# Patient Record
Sex: FEMALE | Race: ASIAN | NOT HISPANIC OR LATINO | Employment: STUDENT | URBAN - METROPOLITAN AREA
[De-identification: names, ages, dates, MRNs, and addresses within clinical notes are randomized per-mention and may not be internally consistent; named-entity substitution may affect disease eponyms.]

---
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
## 2021-05-26 ENCOUNTER — TELEPHONE (OUTPATIENT)
Dept: FAMILY MEDICINE CLINIC | Facility: CLINIC | Age: 21
End: 2021-05-26

## 2021-05-26 NOTE — TELEPHONE ENCOUNTER
PATIENT PREVIOUSLY SAW DR CORMIER AND IS LOOKING TO RE-ESTABLISH CARE AS SHE NEEDS A PHYSICAL AND TB SKIN TEST FOR SCHOOL. HUB OFFERED FIRST OPENING ON 8/27 BUT SHE NEEDS THIS BEFORE 8/23. PATIENT REQUESTED A MESSAGE BE SENT BACK.    PLEASE ADVISE: [phone]

## 2022-05-11 ENCOUNTER — TELEPHONE (OUTPATIENT)
Dept: PEDIATRICS | Facility: OTHER | Age: 22
End: 2022-05-11

## 2022-05-11 NOTE — TELEPHONE ENCOUNTER
Patient last seen in 2017 so technically she is a new patient I cannot put referral without seen her, she needs to make an appointment. Thanks

## 2022-05-11 NOTE — TELEPHONE ENCOUNTER
Caller: THEFACUNDO MUM     Relationship: Mother    Best call back number: [phone]    What is the medical concern/diagnosis: INCONSISTENT PERIODS     What specialty or service is being requested: GYNECOLOGIST     Any additional details: PATIENT'S MOTHER STATED THAT PATIENT CANNOT COME TO HER APPOINTMENT WITH DR CORMIER DUE TO FINALS. PATIENT WILL BE CALLING BACK TO CANCEL. PATIENT'S MOTHER WANTED TO KNOW IF DR CORMIER COULD RECOMMEND A GYNECOLOGIST OR ANY OTHER SPECIALIST THAT CAN HELP PATIENT WITH INCONSISTENT PERIODS. PLEASE ADVISE.

## 2024-06-14 ENCOUNTER — LAB (OUTPATIENT)
Dept: FAMILY MEDICINE CLINIC | Facility: CLINIC | Age: 24
End: 2024-06-14
Payer: COMMERCIAL

## 2024-06-14 ENCOUNTER — OFFICE VISIT (OUTPATIENT)
Dept: FAMILY MEDICINE CLINIC | Facility: CLINIC | Age: 24
End: 2024-06-14
Payer: COMMERCIAL

## 2024-06-14 VITALS
SYSTOLIC BLOOD PRESSURE: 112 MMHG | WEIGHT: 259.6 LBS | BODY MASS INDEX: 44.32 KG/M2 | HEIGHT: 64 IN | OXYGEN SATURATION: 97 % | DIASTOLIC BLOOD PRESSURE: 80 MMHG | RESPIRATION RATE: 16 BRPM | HEART RATE: 102 BPM | TEMPERATURE: 97.3 F

## 2024-06-14 DIAGNOSIS — M25.511 RIGHT SHOULDER PAIN, UNSPECIFIED CHRONICITY: ICD-10-CM

## 2024-06-14 DIAGNOSIS — Z00.00 ENCOUNTER FOR WELL ADULT EXAM WITHOUT ABNORMAL FINDINGS: ICD-10-CM

## 2024-06-14 DIAGNOSIS — E66.01 CLASS 3 SEVERE OBESITY DUE TO EXCESS CALORIES WITH BODY MASS INDEX (BMI) OF 45.0 TO 49.9 IN ADULT, UNSPECIFIED WHETHER SERIOUS COMORBIDITY PRESENT: ICD-10-CM

## 2024-06-14 DIAGNOSIS — Z00.00 ENCOUNTER FOR WELL ADULT EXAM WITHOUT ABNORMAL FINDINGS: Primary | ICD-10-CM

## 2024-06-14 PROBLEM — E66.813 CLASS 3 SEVERE OBESITY DUE TO EXCESS CALORIES WITH BODY MASS INDEX (BMI) OF 45.0 TO 49.9 IN ADULT: Status: ACTIVE | Noted: 2024-06-14

## 2024-06-14 LAB
ALBUMIN SERPL-MCNC: 4 G/DL (ref 3.5–5.2)
ALBUMIN/GLOB SERPL: 1.1 G/DL
ALP SERPL-CCNC: 76 U/L (ref 39–117)
ALT SERPL W P-5'-P-CCNC: 20 U/L (ref 1–33)
ANION GAP SERPL CALCULATED.3IONS-SCNC: 10.3 MMOL/L (ref 5–15)
AST SERPL-CCNC: 12 U/L (ref 1–32)
BILIRUB SERPL-MCNC: 0.4 MG/DL (ref 0–1.2)
BUN SERPL-MCNC: 9 MG/DL (ref 6–20)
BUN/CREAT SERPL: 11.3 (ref 7–25)
CALCIUM SPEC-SCNC: 9.2 MG/DL (ref 8.6–10.5)
CHLORIDE SERPL-SCNC: 104 MMOL/L (ref 98–107)
CHOLEST SERPL-MCNC: 179 MG/DL (ref 0–200)
CO2 SERPL-SCNC: 25.7 MMOL/L (ref 22–29)
CREAT SERPL-MCNC: 0.8 MG/DL (ref 0.57–1)
DEPRECATED RDW RBC AUTO: 37.1 FL (ref 37–54)
EGFRCR SERPLBLD CKD-EPI 2021: 105 ML/MIN/1.73
ERYTHROCYTE [DISTWIDTH] IN BLOOD BY AUTOMATED COUNT: 14.3 % (ref 12.3–15.4)
GLOBULIN UR ELPH-MCNC: 3.8 GM/DL
GLUCOSE SERPL-MCNC: 100 MG/DL (ref 65–99)
HCT VFR BLD AUTO: 47.6 % (ref 34–46.6)
HDLC SERPL-MCNC: 43 MG/DL (ref 40–60)
HGB BLD-MCNC: 14.4 G/DL (ref 12–15.9)
LDLC SERPL CALC-MCNC: 117 MG/DL (ref 0–100)
LDLC/HDLC SERPL: 2.69 {RATIO}
MCH RBC QN AUTO: 22.6 PG (ref 26.6–33)
MCHC RBC AUTO-ENTMCNC: 30.3 G/DL (ref 31.5–35.7)
MCV RBC AUTO: 74.8 FL (ref 79–97)
PLATELET # BLD AUTO: 505 10*3/MM3 (ref 140–450)
PMV BLD AUTO: 9.5 FL (ref 6–12)
POTASSIUM SERPL-SCNC: 4.3 MMOL/L (ref 3.5–5.2)
PROT SERPL-MCNC: 7.8 G/DL (ref 6–8.5)
RBC # BLD AUTO: 6.36 10*6/MM3 (ref 3.77–5.28)
SODIUM SERPL-SCNC: 140 MMOL/L (ref 136–145)
TRIGL SERPL-MCNC: 102 MG/DL (ref 0–150)
TSH SERPL DL<=0.05 MIU/L-ACNC: 1.99 UIU/ML (ref 0.27–4.2)
VLDLC SERPL-MCNC: 19 MG/DL (ref 5–40)
WBC NRBC COR # BLD AUTO: 10.61 10*3/MM3 (ref 3.4–10.8)

## 2024-06-14 PROCEDURE — 80050 GENERAL HEALTH PANEL: CPT | Performed by: FAMILY MEDICINE

## 2024-06-14 PROCEDURE — 80061 LIPID PANEL: CPT | Performed by: FAMILY MEDICINE

## 2024-06-14 PROCEDURE — 99385 PREV VISIT NEW AGE 18-39: CPT | Performed by: FAMILY MEDICINE

## 2024-06-14 PROCEDURE — 36415 COLL VENOUS BLD VENIPUNCTURE: CPT

## 2024-06-14 NOTE — ASSESSMENT & PLAN NOTE
Patient's (Body mass index is 45.26 kg/m².)  Weight is worsening. BMI  is above average; BMI management plan is completed. We discussed portion control and increasing exercise.

## 2024-06-14 NOTE — PROGRESS NOTES
Opal Prather is a 24 y.o. female.     History of Present Illness   The patient comes in today for establish care/ annual physical.  The patient is complaining of right shoulder pain and right wrist pain . The symptoms noted 2 weeks ago while she was helping her dad to pull something out from pound.  The pain described mild and intermittent.  She denies neck pain, arm weakness, headache, cough,chest pain, palpitations, dizziness, abdominal pain, nausea, vomiting, dysuria and SOB. The patient admits to dietary compliance is  fair  and exercising occasionally.  She is a non-smoker.       The following portions of the patient's history were reviewed and updated as appropriate: past medical history, past social history, past surgical history and problem list.    Review of Systems   Constitutional:  Negative for activity change and appetite change.   HENT:  Negative for ear pain, sinus pressure and sore throat.    Eyes:  Negative for visual disturbance.   Respiratory:  Negative for shortness of breath and wheezing.    Cardiovascular:  Negative for chest pain and palpitations.   Gastrointestinal:  Negative for abdominal pain and indigestion.   Musculoskeletal:  Negative for neck pain.        Right shoulder pain and right wrist pain   Neurological:  Negative for headache.   Psychiatric/Behavioral:  Negative for sleep disturbance and depressed mood. The patient is not nervous/anxious.        Objective   Physical Exam  Vitals reviewed.   Constitutional:       Appearance: She is well-developed.   HENT:      Right Ear: Tympanic membrane, ear canal and external ear normal.      Left Ear: Tympanic membrane, ear canal and external ear normal.   Neck:      Thyroid: No thyromegaly.   Cardiovascular:      Heart sounds: Normal heart sounds.   Pulmonary:      Effort: Pulmonary effort is normal.      Breath sounds: Normal breath sounds. No wheezing.   Abdominal:      Tenderness: There is no abdominal tenderness.    Musculoskeletal:         General: No tenderness. Normal range of motion.      Right shoulder: No tenderness. Normal range of motion.      Right wrist: No tenderness. Normal range of motion.      Cervical back: Normal range of motion and neck supple. No tenderness.   Neurological:      Mental Status: She is alert and oriented to person, place, and time.   Psychiatric:         Mood and Affect: Mood normal.         Vitals:    06/14/24 1050   BP: 112/80   Pulse: 102   Resp: 16   Temp: 97.3 °F (36.3 °C)   SpO2: 97%   Body mass index is 45.26 kg/m².  Class 3 Severe Obesity (BMI >=40). Obesity-related health conditions include the following: . Obesity is worsening. BMI is is above average; BMI management plan is completed. We discussed portion control and increasing exercise.   No current outpatient medications on file prior to visit.     No current facility-administered medications on file prior to visit.         Assessment & Plan   Problems Addressed this Visit       Encounter for well adult exam without abnormal findings - Primary     Discussed healthy diet and  importance of regular exercise. Stressed importance of moderation in sodium/caffeine intake,  cholesterol, caloric balance, sufficient intake of fresh fruits and vegetables.            Relevant Orders    TSH    CBC No Differential    Lipid panel    Comprehensive metabolic panel    Class 3 severe obesity due to excess calories with body mass index (BMI) of 45.0 to 49.9 in adult     Patient's (Body mass index is 45.26 kg/m².)  Weight is worsening. BMI  is above average; BMI management plan is completed. We discussed portion control and increasing exercise.          Relevant Orders    TSH    Lipid panel    Right shoulder pain     Discussed intermittent application of heat,  analgesics and ibuprofen as needed.          Diagnoses         Codes Comments    Encounter for well adult exam without abnormal findings    -  Primary ICD-10-CM: Z00.00  ICD-9-CM: V70.0      Class 3 severe obesity due to excess calories with body mass index (BMI) of 45.0 to 49.9 in adult, unspecified whether serious comorbidity present     ICD-10-CM: E66.01, Z68.42  ICD-9-CM: 278.01, V85.42     Right shoulder pain, unspecified chronicity     ICD-10-CM: M25.511  ICD-9-CM: 719.41

## 2024-06-17 DIAGNOSIS — R79.89 INCREASED PLATELET COUNT: Primary | ICD-10-CM

## 2024-07-01 ENCOUNTER — LAB (OUTPATIENT)
Dept: FAMILY MEDICINE CLINIC | Facility: CLINIC | Age: 24
End: 2024-07-01
Payer: COMMERCIAL

## 2024-07-01 DIAGNOSIS — R79.89 INCREASED PLATELET COUNT: ICD-10-CM

## 2024-07-01 LAB
BASOPHILS # BLD AUTO: 0.06 10*3/MM3 (ref 0–0.2)
BASOPHILS NFR BLD AUTO: 0.5 % (ref 0–1.5)
DEPRECATED RDW RBC AUTO: 37.6 FL (ref 37–54)
EOSINOPHIL # BLD AUTO: 0.51 10*3/MM3 (ref 0–0.4)
EOSINOPHIL NFR BLD AUTO: 4.4 % (ref 0.3–6.2)
ERYTHROCYTE [DISTWIDTH] IN BLOOD BY AUTOMATED COUNT: 14.2 % (ref 12.3–15.4)
HCT VFR BLD AUTO: 44.8 % (ref 34–46.6)
HGB BLD-MCNC: 13.3 G/DL (ref 12–15.9)
IMM GRANULOCYTES # BLD AUTO: 0.06 10*3/MM3 (ref 0–0.05)
IMM GRANULOCYTES NFR BLD AUTO: 0.5 % (ref 0–0.5)
LYMPHOCYTES # BLD AUTO: 4.39 10*3/MM3 (ref 0.7–3.1)
LYMPHOCYTES NFR BLD AUTO: 37.8 % (ref 19.6–45.3)
MCH RBC QN AUTO: 22.2 PG (ref 26.6–33)
MCHC RBC AUTO-ENTMCNC: 29.7 G/DL (ref 31.5–35.7)
MCV RBC AUTO: 74.8 FL (ref 79–97)
MONOCYTES # BLD AUTO: 0.67 10*3/MM3 (ref 0.1–0.9)
MONOCYTES NFR BLD AUTO: 5.8 % (ref 5–12)
NEUTROPHILS NFR BLD AUTO: 5.93 10*3/MM3 (ref 1.7–7)
NEUTROPHILS NFR BLD AUTO: 51 % (ref 42.7–76)
NRBC BLD AUTO-RTO: 0 /100 WBC (ref 0–0.2)
PLATELET # BLD AUTO: 499 10*3/MM3 (ref 140–450)
PMV BLD AUTO: 10.3 FL (ref 6–12)
RBC # BLD AUTO: 5.99 10*6/MM3 (ref 3.77–5.28)
WBC NRBC COR # BLD AUTO: 11.62 10*3/MM3 (ref 3.4–10.8)

## 2024-07-01 PROCEDURE — 36415 COLL VENOUS BLD VENIPUNCTURE: CPT

## 2024-07-01 PROCEDURE — 85025 COMPLETE CBC W/AUTO DIFF WBC: CPT | Performed by: FAMILY MEDICINE

## 2024-07-03 DIAGNOSIS — R79.89 ELEVATED PLATELET COUNT: Primary | ICD-10-CM

## 2024-07-03 DIAGNOSIS — R79.89 ABNORMAL CBC: ICD-10-CM

## 2024-07-16 ENCOUNTER — LAB (OUTPATIENT)
Dept: LAB | Facility: HOSPITAL | Age: 24
End: 2024-07-16
Payer: COMMERCIAL

## 2024-07-16 ENCOUNTER — CONSULT (OUTPATIENT)
Dept: ONCOLOGY | Facility: CLINIC | Age: 24
End: 2024-07-16
Payer: COMMERCIAL

## 2024-07-16 VITALS
BODY MASS INDEX: 45.04 KG/M2 | SYSTOLIC BLOOD PRESSURE: 112 MMHG | WEIGHT: 263.8 LBS | DIASTOLIC BLOOD PRESSURE: 79 MMHG | HEART RATE: 88 BPM | HEIGHT: 64 IN | OXYGEN SATURATION: 95 %

## 2024-07-16 DIAGNOSIS — D75.839 THROMBOCYTOSIS: ICD-10-CM

## 2024-07-16 DIAGNOSIS — D50.0 IRON DEFICIENCY ANEMIA DUE TO CHRONIC BLOOD LOSS: ICD-10-CM

## 2024-07-16 DIAGNOSIS — E53.8 FOLATE DEFICIENCY: ICD-10-CM

## 2024-07-16 DIAGNOSIS — D75.839 THROMBOCYTOSIS: Primary | ICD-10-CM

## 2024-07-16 LAB
ALBUMIN SERPL-MCNC: 4.2 G/DL (ref 3.5–5.2)
ALBUMIN/GLOB SERPL: 1.2 G/DL
ALP SERPL-CCNC: 76 U/L (ref 39–117)
ALT SERPL W P-5'-P-CCNC: 19 U/L (ref 1–33)
ANION GAP SERPL CALCULATED.3IONS-SCNC: 9 MMOL/L (ref 5–15)
AST SERPL-CCNC: 17 U/L (ref 1–32)
BASOPHILS # BLD AUTO: 0.03 10*3/MM3 (ref 0–0.2)
BASOPHILS NFR BLD AUTO: 0.3 % (ref 0–1.5)
BILIRUB SERPL-MCNC: 0.3 MG/DL (ref 0–1.2)
BUN SERPL-MCNC: 9 MG/DL (ref 6–20)
BUN/CREAT SERPL: 12.7 (ref 7–25)
CALCIUM SPEC-SCNC: 9.1 MG/DL (ref 8.6–10.5)
CHLORIDE SERPL-SCNC: 104 MMOL/L (ref 98–107)
CO2 SERPL-SCNC: 27 MMOL/L (ref 22–29)
CREAT SERPL-MCNC: 0.71 MG/DL (ref 0.57–1)
DEPRECATED RDW RBC AUTO: 42.4 FL (ref 37–54)
EGFRCR SERPLBLD CKD-EPI 2021: 121.2 ML/MIN/1.73
EOSINOPHIL # BLD AUTO: 0.48 10*3/MM3 (ref 0–0.4)
EOSINOPHIL NFR BLD AUTO: 4.4 % (ref 0.3–6.2)
ERYTHROCYTE [DISTWIDTH] IN BLOOD BY AUTOMATED COUNT: 16.6 % (ref 12.3–15.4)
FERRITIN SERPL-MCNC: 81.7 NG/ML (ref 13–150)
FOLATE SERPL-MCNC: 4.16 NG/ML (ref 4.78–24.2)
GLOBULIN UR ELPH-MCNC: 3.5 GM/DL
GLUCOSE SERPL-MCNC: 114 MG/DL (ref 65–99)
HCT VFR BLD AUTO: 45.3 % (ref 34–46.6)
HGB BLD-MCNC: 14 G/DL (ref 12–15.9)
IRON 24H UR-MRATE: 50 MCG/DL (ref 37–145)
IRON SATN MFR SERPL: 12 % (ref 20–50)
LYMPHOCYTES # BLD AUTO: 3.5 10*3/MM3 (ref 0.7–3.1)
LYMPHOCYTES NFR BLD AUTO: 32.2 % (ref 19.6–45.3)
MCH RBC QN AUTO: 23.1 PG (ref 26.6–33)
MCHC RBC AUTO-ENTMCNC: 30.9 G/DL (ref 31.5–35.7)
MCV RBC AUTO: 74.8 FL (ref 79–97)
MONOCYTES # BLD AUTO: 0.58 10*3/MM3 (ref 0.1–0.9)
MONOCYTES NFR BLD AUTO: 5.3 % (ref 5–12)
NEUTROPHILS NFR BLD AUTO: 57.8 % (ref 42.7–76)
NEUTROPHILS NFR BLD AUTO: 6.29 10*3/MM3 (ref 1.7–7)
PLATELET # BLD AUTO: 402 10*3/MM3 (ref 140–450)
PMV BLD AUTO: 9.6 FL (ref 6–12)
POTASSIUM SERPL-SCNC: 4.4 MMOL/L (ref 3.5–5.2)
PROT SERPL-MCNC: 7.7 G/DL (ref 6–8.5)
RBC # BLD AUTO: 6.06 10*6/MM3 (ref 3.77–5.28)
SODIUM SERPL-SCNC: 140 MMOL/L (ref 136–145)
TIBC SERPL-MCNC: 401 MCG/DL (ref 298–536)
TRANSFERRIN SERPL-MCNC: 269 MG/DL (ref 200–360)
VIT B12 BLD-MCNC: 397 PG/ML (ref 211–946)
WBC NRBC COR # BLD AUTO: 10.88 10*3/MM3 (ref 3.4–10.8)

## 2024-07-16 PROCEDURE — 84466 ASSAY OF TRANSFERRIN: CPT | Performed by: STUDENT IN AN ORGANIZED HEALTH CARE EDUCATION/TRAINING PROGRAM

## 2024-07-16 PROCEDURE — 82746 ASSAY OF FOLIC ACID SERUM: CPT | Performed by: STUDENT IN AN ORGANIZED HEALTH CARE EDUCATION/TRAINING PROGRAM

## 2024-07-16 PROCEDURE — 82607 VITAMIN B-12: CPT | Performed by: STUDENT IN AN ORGANIZED HEALTH CARE EDUCATION/TRAINING PROGRAM

## 2024-07-16 PROCEDURE — 83540 ASSAY OF IRON: CPT | Performed by: STUDENT IN AN ORGANIZED HEALTH CARE EDUCATION/TRAINING PROGRAM

## 2024-07-16 PROCEDURE — 80053 COMPREHEN METABOLIC PANEL: CPT | Performed by: STUDENT IN AN ORGANIZED HEALTH CARE EDUCATION/TRAINING PROGRAM

## 2024-07-16 PROCEDURE — 82728 ASSAY OF FERRITIN: CPT | Performed by: STUDENT IN AN ORGANIZED HEALTH CARE EDUCATION/TRAINING PROGRAM

## 2024-07-16 PROCEDURE — 36415 COLL VENOUS BLD VENIPUNCTURE: CPT

## 2024-07-16 PROCEDURE — 85025 COMPLETE CBC W/AUTO DIFF WBC: CPT

## 2024-07-16 RX ORDER — MEDROXYPROGESTERONE ACETATE 10 MG/1
TABLET ORAL
COMMUNITY
Start: 2023-12-04

## 2024-07-16 NOTE — PROGRESS NOTES
HEMATOLOGY ONCOLOGY OUTPATIENT FOLLOW UP       Patient name: Barrington Prather  : 2000  MRN: [id number]  Primary Care Physician: Wilner Zhu MD  Referring Physician: Wilner Zhu MD  Reason For Consult:         History of Present Illness:  Patient is a 24-year-old female who has been referred to us for further evaluation and management of elevated platelet counts.  Most recent labs were reviewed and are as follows:    2024:  CBC: 10.6/14.4/47.6/505 [MCV 74.8, MCH 22.6]    On review of labs, patient is noted to have microcytic anemia with relatively normal Hb/HCT over past many years.  Noted to have mildly elevated platelet counts.  This development is of recent onset.    Patient reported that she has remote history of iron deficiency anemia, however she has not been on  any oral or IV iron supplements.    She has history of menorrhagia and irregular menses.  She is currently on OCPs for irregular cycles for the last 1-2 years.  No past medical history of abnormal bleeding.    Reported only occasional alcohol use, no smoking history.    Family history:   Sister: Thyroid Cancer (at age 21), unclear type.      Subjective:  Patient seen today for initial evaluation.  She has some mild arthralgias and some chronic fatigue.  Denied any other acute issues presently.    History reviewed. No pertinent past medical history.    Past Surgical History:   Procedure Laterality Date    TONSILLECTOMY           Current Outpatient Medications:     medroxyPROGESTERone (PROVERA) 10 MG tablet, , Disp: , Rfl:     Allergies   Allergen Reactions    Amoxicillin Rash       Family History   Problem Relation Age of Onset    Hyperlipidemia Mother     Diabetes Father     Other Sister        Cancer-related family history is not on file.    Social History     Tobacco Use    Smoking status: Never     Passive exposure: Never    Smokeless tobacco: Never   Vaping Use    Vaping status: Never Used   Substance Use Topics    Alcohol  "use: Never    Drug use: Never     Social History     Social History Narrative    Not on file        Visit date not found.    ROS:   Review of Systems   Constitutional:  Positive for fatigue.   HENT: Negative.     Eyes: Negative.    Respiratory: Negative.     Cardiovascular: Negative.    Gastrointestinal: Negative.    Endocrine: Negative.    Genitourinary: Negative.    Musculoskeletal:  Positive for arthralgias.   Skin: Negative.    Allergic/Immunologic: Negative.    Neurological: Negative.    Hematological: Negative.    Psychiatric/Behavioral: Negative.         Objective:  Vital signs:  Vitals:    07/16/24 0844   BP: 112/79   Pulse: 88   SpO2: 95%   Weight: 120 kg (263 lb 12.8 oz)   Height: 161.3 cm (63.5\")   PainSc: 0-No pain     Body mass index is 46 kg/m².  ECOG  (0) Fully active, able to carry on all predisease performance without restriction    Physical Exam:   Physical Exam  Constitutional:       Appearance: Normal appearance. She is normal weight.   HENT:      Head: Normocephalic and atraumatic.      Right Ear: External ear normal.      Left Ear: External ear normal.      Nose: Nose normal.      Mouth/Throat:      Mouth: Mucous membranes are moist.      Pharynx: Oropharynx is clear.   Eyes:      Extraocular Movements: Extraocular movements intact.      Conjunctiva/sclera: Conjunctivae normal.      Pupils: Pupils are equal, round, and reactive to light.   Cardiovascular:      Rate and Rhythm: Normal rate.      Pulses: Normal pulses.   Pulmonary:      Effort: Pulmonary effort is normal.   Abdominal:      General: Abdomen is flat.      Palpations: Abdomen is soft.   Musculoskeletal:         General: Normal range of motion.      Cervical back: Normal range of motion and neck supple.   Skin:     General: Skin is warm.   Neurological:      Mental Status: She is alert.   Psychiatric:         Mood and Affect: Mood normal.         Behavior: Behavior normal.         Thought Content: Thought content normal.         " "Judgment: Judgment normal.         Lab Results - Last 18 Months   Lab Units 07/16/24  0928 07/01/24  0813 06/14/24  1132   WBC 10*3/mm3 10.88* 11.62* 10.61   HEMOGLOBIN g/dL 14.0 13.3 14.4   HEMATOCRIT % 45.3 44.8 47.6*   PLATELETS 10*3/mm3 402 499* 505*   MCV fL 74.8* 74.8* 74.8*     Lab Results - Last 18 Months   Lab Units 07/16/24  0928 06/14/24  1132   SODIUM mmol/L 140 140   POTASSIUM mmol/L 4.4 4.3   CHLORIDE mmol/L 104 104   CO2 mmol/L 27.0 25.7   BUN mg/dL 9 9   CREATININE mg/dL 0.71 0.80   CALCIUM mg/dL 9.1 9.2   BILIRUBIN mg/dL 0.3 0.4   ALK PHOS U/L 76 76   ALT (SGPT) U/L 19 20   AST (SGOT) U/L 17 12   GLUCOSE mg/dL 114* 100*       Lab Results   Component Value Date    GLUCOSE 100 (H) 06/14/2024    BUN 9 06/14/2024    CREATININE 0.80 06/14/2024    EGFRIFNONA NOT CALCULATED 03/08/2016    EGFRIFAFRI NOT CALCULATED 03/08/2016    BCR 11.3 06/14/2024    K 4.3 06/14/2024    CO2 25.7 06/14/2024    CALCIUM 9.2 06/14/2024    ALBUMIN 4.0 06/14/2024    LABIL2 1.1 08/08/2017    AST 12 06/14/2024    ALT 20 06/14/2024       No results for input(s): \"APTT\", \"INR\", \"PTT\" in the last 04798 hours.    Lab Results   Component Value Date    IRON 50 07/16/2024    TIBC 401 07/16/2024    FERRITIN 81.70 07/16/2024       Lab Results   Component Value Date    FOLATE 4.16 (L) 07/16/2024       No results found for: \"OCCULTBLD\"    No results found for: \"RETICCTPCT\"  Lab Results   Component Value Date    [id number] 397 07/16/2024     No results found for: \"SPEP\", \"UPEP\"  No results found for: \"LDH\", \"URICACID\"  No results found for: \"JOANNE\", \"RF\", \"SEDRATE\"  No results found for: \"FIBRINOGEN\", \"HAPTOGLOBIN\"  No results found for: \"PTT\", \"INR\"  No results found for: \"\"  No results found for: \"CEA\"  No components found for: \"CA-19-9\"  No results found for: \"PSA\"  No results found for: \"SEDRATE\"    Assessment & Plan       Iron deficiency:  Thrombocytosis:  -Outside labs reviewed, noted to have chronic microcytosis suggestive of ongoing " iron deficiency anemia.  Repeat CBC today showed normal Hb/HCT.  However low TSAT 12% is indicative of some iron deficiency anemia.  -Thrombocytosis appears likely reactive secondary to iron deficiency anemia, however given young age will perform workup to rule out MPN's and CML.  This is pending.  -Will start her on oral iron supplementation.  If no improvement in microcytosis, can be considered for Hb electrophoresis to rule out hemoglobinopathies.    Folate deficiency:   Patient is noted to have low folate levels.  Will start her on oral supplementation.    3-week follow-up to discuss lab results.  Sooner as needed.      Thank you very much for providing the opportunity to participate in this patient’s care. Please do not hesitate to call if there are any other questions.

## 2024-07-17 ENCOUNTER — PATIENT ROUNDING (BHMG ONLY) (OUTPATIENT)
Dept: ONCOLOGY | Facility: CLINIC | Age: 24
End: 2024-07-17
Payer: COMMERCIAL

## 2024-07-17 NOTE — PROGRESS NOTES
July 17, 2024    Hello, may I speak with Barrington Prather?    My name is Anusha Ortega      I am  with [facility]  [address] [place] [address] [phone].    Before we get started may I verify your date of birth? 2000    I am calling to officially welcome you to our practice and ask about your recent visit. Is this a good time to talk? no    Tell me about your visit with us. What things went well?  A My Chart message was sent to the patient.         We're always looking for ways to make our patients' experiences even better. Do you have recommendations on ways we may improve?  no    Overall were you satisfied with your first visit to our practice? yes       I appreciate you taking the time to speak with me today. Is there anything else I can do for you? no      Thank you, and have a great day.

## 2024-07-18 RX ORDER — FOLIC ACID 1 MG/1
1 TABLET ORAL DAILY
Qty: 90 TABLET | Refills: 1 | Status: SHIPPED | OUTPATIENT
Start: 2024-07-18

## 2024-07-18 RX ORDER — FERROUS SULFATE 325(65) MG
325 TABLET ORAL
Qty: 90 TABLET | Refills: 1 | Status: SHIPPED | OUTPATIENT
Start: 2024-07-18

## 2024-07-21 LAB
INTERPRETATION: NEGATIVE
LAB DIRECTOR NAME PROVIDER: NORMAL
LABORATORY COMMENT REPORT: NORMAL
REF LAB TEST METHOD: NORMAL
T(ABL1,BCR)B2A2/CONTROL BLD/T: NORMAL %
T(ABL1,BCR)B3A2/CONTROL BLD/T: NORMAL %
T(ABL1,BCR)E1A2/CONTROL BLD/T: NORMAL %

## 2024-07-22 LAB
CALR EXON 9 MUT ANL BLD/T: NORMAL
CITATION REF LAB TEST: NORMAL
LAB DIRECTOR NAME PROVIDER: NORMAL
Lab: NORMAL
REF LAB TEST METHOD: NORMAL

## 2024-07-25 LAB
JAK2 P.V617F BLD/T QL: NORMAL
LAB DIRECTOR NAME PROVIDER: NORMAL
LABORATORY COMMENT REPORT: NORMAL

## 2024-07-26 LAB
CITATION REF LAB TEST: NORMAL
LAB DIRECTOR NAME PROVIDER: NORMAL
MPL GENE MUT TESTED BLD/T: NORMAL
MPL P.W515L+W515K+S505N BLD/T QL: NORMAL
SERVICE CMNT-IMP: NORMAL

## 2024-07-30 ENCOUNTER — TELEPHONE (OUTPATIENT)
Dept: ONCOLOGY | Facility: CLINIC | Age: 24
End: 2024-07-30
Payer: COMMERCIAL

## 2024-07-30 NOTE — TELEPHONE ENCOUNTER
Pt moved to NP schedule per Dr Naranjo. TESSA ORDAZ on pt phone with that provider update and slight change to appt time.

## 2024-08-02 ENCOUNTER — OFFICE VISIT (OUTPATIENT)
Dept: FAMILY MEDICINE CLINIC | Facility: CLINIC | Age: 24
End: 2024-08-02
Payer: COMMERCIAL

## 2024-08-02 VITALS
WEIGHT: 258.7 LBS | TEMPERATURE: 97.2 F | OXYGEN SATURATION: 97 % | SYSTOLIC BLOOD PRESSURE: 111 MMHG | RESPIRATION RATE: 16 BRPM | DIASTOLIC BLOOD PRESSURE: 81 MMHG | HEART RATE: 87 BPM | BODY MASS INDEX: 44.16 KG/M2 | HEIGHT: 64 IN

## 2024-08-02 DIAGNOSIS — Z02.0 ENCOUNTER FOR SCHOOL EXAMINATION: Primary | ICD-10-CM

## 2024-08-02 PROCEDURE — 99212 OFFICE O/P EST SF 10 MIN: CPT | Performed by: FAMILY MEDICINE

## 2024-08-02 NOTE — PROGRESS NOTES
Mild Subjective   Barrington Prather is a 24 y.o. female.     History of Present Illness  24-year-old female patient present for school physical and fill out the school physical form. She is going to  nursing school, she is doing very well denies any complaint and concern.  Patient is stated she is up-to-date with immunization and has had already submitted her immunization record.  She brought physical activity form to be filled out.       The following portions of the patient's history were reviewed and updated as appropriate: past medical history, past social history, past surgical history and problem list.    Review of Systems   Respiratory:  Negative for shortness of breath and wheezing.    Musculoskeletal:  Negative for back pain.   Psychiatric/Behavioral:  Negative for sleep disturbance. The patient is not nervous/anxious.        Objective   Physical Exam  Vitals reviewed.   Pulmonary:      Effort: Pulmonary effort is normal.   Musculoskeletal:         General: Normal range of motion.   Neurological:      Mental Status: She is alert and oriented to person, place, and time.   Psychiatric:         Mood and Affect: Mood normal.         Vitals:    08/02/24 1337   BP: 111/81   Pulse: 87   Resp: 16   Temp: 97.2 °F (36.2 °C)   SpO2: 97%     Current Outpatient Medications on File Prior to Visit   Medication Sig Dispense Refill    ferrous sulfate 325 (65 FE) MG tablet Take 1 tablet by mouth Daily With Breakfast. 90 tablet 1    folic acid (FOLVITE) 1 MG tablet Take 1 tablet by mouth Daily. 90 tablet 1    medroxyPROGESTERone (PROVERA) 10 MG tablet        No current facility-administered medications on file prior to visit.         Assessment & Plan   Problems Addressed this Visit       Encounter for school examination - Primary     Patient is doing well exam normal.  Discussed dietary changes and lifestyle modifications,  Physical activity forms filled and signed.          Diagnoses         Codes Comments    Encounter for  school examination and fill out form    -  Primary ICD-10-CM: Z02.0  ICD-9-CM: V70.5

## 2024-08-03 PROBLEM — Z02.0 ENCOUNTER FOR SCHOOL EXAMINATION: Status: ACTIVE | Noted: 2024-06-14

## 2024-08-03 NOTE — ASSESSMENT & PLAN NOTE
Patient is doing well exam normal.  Discussed dietary changes and lifestyle modifications,  Physical activity forms filled and signed.

## 2024-08-07 NOTE — PROGRESS NOTES
HEMATOLOGY ONCOLOGY OUTPATIENT FOLLOW UP       Patient name: Barrington Prather  : 2000  MRN: [id number]  Primary Care Physician: Wilner Zhu MD  Referring Physician: No ref. provider found  Reason For Consult:         History of Present Illness:  Barrington Prather is a 24 y.o.  female who has been referred to us for further evaluation and management of elevated platelet counts.  Most recent labs were reviewed and are as follows:    2024:  CBC: 10.6/14.4/47.6/505 [MCV 74.8, MCH 22.6]    On review of labs, patient is noted to have microcytic anemia with relatively normal Hb/HCT over past many years.  Noted to have mildly elevated platelet counts.  This development is of recent onset.    Patient reported that she has remote history of iron deficiency anemia, however she has not been on  any oral or IV iron supplements.    She has history of menorrhagia and irregular menses.  She is currently on OCPs for irregular cycles for the last 1-2 years.  No past medical history of abnormal bleeding.    Reported only occasional alcohol use, no smoking history.    Family history:   Sister: Thyroid Cancer (at age 21), unclear type.    Patient seen today for initial evaluation.  She has some mild arthralgias and some chronic fatigue.  Denied any other acute issues presently.    2024: WBC 10.88, hemoglobin 14.0, MCV 74.8, platelets 402,000.    JAK2 analysis negative for JAK2 V617F mutation, CALR mutation negative, BCR-ABL negative. No MPL mutation identified.     Subjective:  2024: Patient presents today for routine follow-up and lab review.  She reports overall feeling well and has no new complaints today.  She has not been taking folic acid or iron supplementation.  She states she did get a call from the pharmacy and will  her medications today and begin.      History reviewed. No pertinent past medical history.    Past Surgical History:   Procedure Laterality Date    TONSILLECTOMY           Current  "Outpatient Medications:     medroxyPROGESTERone (PROVERA) 10 MG tablet, , Disp: , Rfl:     ferrous sulfate 325 (65 FE) MG tablet, Take 1 tablet by mouth Daily With Breakfast. (Patient not taking: Reported on 8/8/2024), Disp: 90 tablet, Rfl: 1    folic acid (FOLVITE) 1 MG tablet, Take 1 tablet by mouth Daily. (Patient not taking: Reported on 8/8/2024), Disp: 90 tablet, Rfl: 1    Allergies   Allergen Reactions    Amoxicillin Rash       Family History   Problem Relation Age of Onset    Hyperlipidemia Mother     Diabetes Father     Other Sister        Cancer-related family history is not on file.    Social History     Tobacco Use    Smoking status: Never     Passive exposure: Never    Smokeless tobacco: Never   Vaping Use    Vaping status: Never Used   Substance Use Topics    Alcohol use: Never    Drug use: Never     Social History     Social History Narrative    Not on file        Visit date not found.    ROS:   Review of Systems   Constitutional: Negative.    HENT: Negative.     Respiratory: Negative.     Cardiovascular: Negative.    Gastrointestinal: Negative.    Musculoskeletal: Negative.    Neurological: Negative.    Hematological: Negative.        Objective:  Vital signs:  Vitals:    08/08/24 0927   BP: 114/80   Pulse: 93   Temp: 98 °F (36.7 °C)   SpO2: 98%   Weight: 119 kg (262 lb)   Height: 161.3 cm (63.5\")   PainSc: 0-No pain       Body mass index is 45.68 kg/m².  ECOG  (0) Fully active, able to carry on all predisease performance without restriction    Physical Exam:   Physical Exam  Vitals reviewed.   Constitutional:       Appearance: Normal appearance.   HENT:      Head: Normocephalic and atraumatic.   Eyes:      Pupils: Pupils are equal, round, and reactive to light.   Cardiovascular:      Rate and Rhythm: Normal rate and regular rhythm.   Pulmonary:      Effort: Pulmonary effort is normal.      Breath sounds: Normal breath sounds.   Abdominal:      General: There is no distension.      Palpations: Abdomen " "is soft. There is no mass.      Tenderness: There is no abdominal tenderness.   Musculoskeletal:         General: Normal range of motion.      Cervical back: Normal range of motion and neck supple.   Skin:     General: Skin is warm.   Neurological:      General: No focal deficit present.      Mental Status: She is alert.   Psychiatric:         Mood and Affect: Mood normal.         Lab Results - Last 18 Months   Lab Units 07/16/24  0928 07/01/24  0813 06/14/24  1132   WBC 10*3/mm3 10.88* 11.62* 10.61   HEMOGLOBIN g/dL 14.0 13.3 14.4   HEMATOCRIT % 45.3 44.8 47.6*   PLATELETS 10*3/mm3 402 499* 505*   MCV fL 74.8* 74.8* 74.8*     Lab Results - Last 18 Months   Lab Units 07/16/24  0928 06/14/24  1132   SODIUM mmol/L 140 140   POTASSIUM mmol/L 4.4 4.3   CHLORIDE mmol/L 104 104   CO2 mmol/L 27.0 25.7   BUN mg/dL 9 9   CREATININE mg/dL 0.71 0.80   CALCIUM mg/dL 9.1 9.2   BILIRUBIN mg/dL 0.3 0.4   ALK PHOS U/L 76 76   ALT (SGPT) U/L 19 20   AST (SGOT) U/L 17 12   GLUCOSE mg/dL 114* 100*       Lab Results   Component Value Date    GLUCOSE 114 (H) 07/16/2024    BUN 9 07/16/2024    CREATININE 0.71 07/16/2024    EGFRIFNONA NOT CALCULATED 03/08/2016    EGFRIFAFRI NOT CALCULATED 03/08/2016    BCR 12.7 07/16/2024    K 4.4 07/16/2024    CO2 27.0 07/16/2024    CALCIUM 9.1 07/16/2024    ALBUMIN 4.2 07/16/2024    LABIL2 1.1 08/08/2017    AST 17 07/16/2024    ALT 19 07/16/2024       No results for input(s): \"APTT\", \"INR\", \"PTT\" in the last 17841 hours.    Lab Results   Component Value Date    IRON 50 07/16/2024    TIBC 401 07/16/2024    FERRITIN 81.70 07/16/2024       Lab Results   Component Value Date    FOLATE 4.16 (L) 07/16/2024       No results found for: \"OCCULTBLD\"    No results found for: \"RETICCTPCT\"  Lab Results   Component Value Date    [id number] 397 07/16/2024     No results found for: \"SPEP\", \"UPEP\"  No results found for: \"LDH\", \"URICACID\"  No results found for: \"JOANNE\", \"RF\", \"SEDRATE\"  No results found for: \"FIBRINOGEN\", " "\"HAPTOGLOBIN\"  No results found for: \"PTT\", \"INR\"  No results found for: \"\"  No results found for: \"CEA\"  No components found for: \"CA-19-9\"  No results found for: \"PSA\"  No results found for: \"SEDRATE\"    Assessment & Plan       Iron deficiency:  Thrombocytosis:  Leukocytosis:  -Outside labs reviewed, noted to have chronic microcytosis suggestive of ongoing iron deficiency anemia.  However low TSAT 12% is indicative of some iron deficiency anemia.  -Thrombocytosis appears likely reactive secondary to iron deficiency anemia, however given young age will perform workup to rule out MPN's and CML.    -Will start her on oral iron supplementation.  If no improvement in microcytosis, can be considered for hemoglobin electrophoresis to rule out hemoglobinopathies.  Reviewed with patient.    Folate deficiency:   Patient was noted to have low folate levels.  Will start her on oral supplementation.      Discussed with patient regarding oral iron and abnormal folate supplementation.  She states that she will pick it up from the pharmacy today.     I have answered all her questions to her satisfaction.     I have reviewed labs results, imaging, vitals, and medications with the patient today.       Patient verbalized understanding and is in agreement of the above plan.      She will follow-up with Dr. Naranjo in 6 weeks, sooner if condition indicates.  Repeat labs at that time.    Electronically signed by Alisia Navarro PA-C      Thank you very much for providing the opportunity to participate in this patient’s care. Please do not hesitate to call if there are any other questions.    Time spent on encounter including record review, history taking, exam, discussion, counseling and documentation at: 30 minutes     "

## 2024-08-08 ENCOUNTER — OFFICE VISIT (OUTPATIENT)
Dept: ONCOLOGY | Facility: CLINIC | Age: 24
End: 2024-08-08
Payer: COMMERCIAL

## 2024-08-08 VITALS
WEIGHT: 262 LBS | BODY MASS INDEX: 44.73 KG/M2 | HEIGHT: 64 IN | DIASTOLIC BLOOD PRESSURE: 80 MMHG | HEART RATE: 93 BPM | SYSTOLIC BLOOD PRESSURE: 114 MMHG | OXYGEN SATURATION: 98 % | TEMPERATURE: 98 F

## 2024-08-08 DIAGNOSIS — E53.8 FOLATE DEFICIENCY: ICD-10-CM

## 2024-08-08 DIAGNOSIS — D75.839 THROMBOCYTOSIS: Primary | ICD-10-CM

## 2024-08-08 DIAGNOSIS — D50.0 IRON DEFICIENCY ANEMIA DUE TO CHRONIC BLOOD LOSS: ICD-10-CM

## 2024-09-19 ENCOUNTER — OFFICE VISIT (OUTPATIENT)
Dept: ONCOLOGY | Facility: CLINIC | Age: 24
End: 2024-09-19
Payer: COMMERCIAL

## 2024-09-19 ENCOUNTER — LAB (OUTPATIENT)
Dept: LAB | Facility: HOSPITAL | Age: 24
End: 2024-09-19
Payer: COMMERCIAL

## 2024-09-19 VITALS
BODY MASS INDEX: 44.83 KG/M2 | OXYGEN SATURATION: 97 % | WEIGHT: 262.6 LBS | HEIGHT: 64 IN | SYSTOLIC BLOOD PRESSURE: 179 MMHG | HEART RATE: 96 BPM | DIASTOLIC BLOOD PRESSURE: 89 MMHG

## 2024-09-19 DIAGNOSIS — D75.839 THROMBOCYTOSIS: Primary | ICD-10-CM

## 2024-09-19 DIAGNOSIS — E53.8 FOLATE DEFICIENCY: ICD-10-CM

## 2024-09-19 DIAGNOSIS — D50.0 IRON DEFICIENCY ANEMIA DUE TO CHRONIC BLOOD LOSS: ICD-10-CM

## 2024-09-19 LAB
BASOPHILS # BLD AUTO: 0.03 10*3/MM3 (ref 0–0.2)
BASOPHILS NFR BLD AUTO: 0.3 % (ref 0–1.5)
DEPRECATED RDW RBC AUTO: 46.9 FL (ref 37–54)
EOSINOPHIL # BLD AUTO: 0.4 10*3/MM3 (ref 0–0.4)
EOSINOPHIL NFR BLD AUTO: 4 % (ref 0.3–6.2)
ERYTHROCYTE [DISTWIDTH] IN BLOOD BY AUTOMATED COUNT: 18.1 % (ref 12.3–15.4)
FERRITIN SERPL-MCNC: 122 NG/ML (ref 13–150)
HCT VFR BLD AUTO: 46.2 % (ref 34–46.6)
HGB BLD-MCNC: 14 G/DL (ref 12–15.9)
HOLD SPECIMEN: NORMAL
IRON 24H UR-MRATE: 76 MCG/DL (ref 37–145)
IRON SATN MFR SERPL: 18 % (ref 20–50)
LYMPHOCYTES # BLD AUTO: 3.68 10*3/MM3 (ref 0.7–3.1)
LYMPHOCYTES NFR BLD AUTO: 37.2 % (ref 19.6–45.3)
MCH RBC QN AUTO: 23 PG (ref 26.6–33)
MCHC RBC AUTO-ENTMCNC: 30.3 G/DL (ref 31.5–35.7)
MCV RBC AUTO: 76 FL (ref 79–97)
MONOCYTES # BLD AUTO: 0.5 10*3/MM3 (ref 0.1–0.9)
MONOCYTES NFR BLD AUTO: 5.1 % (ref 5–12)
NEUTROPHILS NFR BLD AUTO: 5.28 10*3/MM3 (ref 1.7–7)
NEUTROPHILS NFR BLD AUTO: 53.4 % (ref 42.7–76)
PLATELET # BLD AUTO: 460 10*3/MM3 (ref 140–450)
PMV BLD AUTO: 9.5 FL (ref 6–12)
RBC # BLD AUTO: 6.08 10*6/MM3 (ref 3.77–5.28)
TIBC SERPL-MCNC: 431 MCG/DL (ref 298–536)
TRANSFERRIN SERPL-MCNC: 289 MG/DL (ref 200–360)
WBC NRBC COR # BLD AUTO: 9.89 10*3/MM3 (ref 3.4–10.8)

## 2024-09-19 PROCEDURE — 85025 COMPLETE CBC W/AUTO DIFF WBC: CPT

## 2024-09-19 PROCEDURE — 83540 ASSAY OF IRON: CPT | Performed by: PHYSICIAN ASSISTANT

## 2024-09-19 PROCEDURE — 36415 COLL VENOUS BLD VENIPUNCTURE: CPT

## 2024-09-19 PROCEDURE — 84466 ASSAY OF TRANSFERRIN: CPT | Performed by: PHYSICIAN ASSISTANT

## 2024-09-19 PROCEDURE — 82728 ASSAY OF FERRITIN: CPT | Performed by: PHYSICIAN ASSISTANT

## 2024-10-04 ENCOUNTER — TELEPHONE (OUTPATIENT)
Dept: FAMILY MEDICINE CLINIC | Facility: CLINIC | Age: 24
End: 2024-10-04

## 2024-10-04 NOTE — TELEPHONE ENCOUNTER
Caller: Barrington Prather    Relationship: Self    Best call back number: [phone]    What medication are you requesting: PREVERA- MEDICATION TO HELP HER START HER PERIOD       If a prescription is needed, what is your preferred pharmacy and phone number:      [facility] [id number] [place], [place] - [facility] - [phone]  - [phone] FX       Additional notes:

## 2024-10-07 NOTE — TELEPHONE ENCOUNTER
Left patient a message informing her to make an appointment with an OBGYN or call to make an appointment with us

## 2024-10-07 NOTE — TELEPHONE ENCOUNTER
Patient need to follow-up with OB if she had one regarding menstruation issues otherwise schedule an appointment  in our office to discuss further,  thanks

## 2024-10-21 ENCOUNTER — OFFICE VISIT (OUTPATIENT)
Dept: FAMILY MEDICINE CLINIC | Facility: CLINIC | Age: 24
End: 2024-10-21
Payer: COMMERCIAL

## 2024-10-21 VITALS
SYSTOLIC BLOOD PRESSURE: 113 MMHG | HEIGHT: 64 IN | OXYGEN SATURATION: 97 % | TEMPERATURE: 97.7 F | RESPIRATION RATE: 16 BRPM | HEART RATE: 83 BPM | WEIGHT: 257.6 LBS | BODY MASS INDEX: 43.98 KG/M2 | DIASTOLIC BLOOD PRESSURE: 78 MMHG

## 2024-10-21 DIAGNOSIS — N92.6 IRREGULAR PERIODS/MENSTRUAL CYCLES: ICD-10-CM

## 2024-10-21 DIAGNOSIS — Z01.419 ENCNTR FOR GYN EXAM (GENERAL) (ROUTINE) W/O ABN FINDINGS: Primary | ICD-10-CM

## 2024-10-21 PROCEDURE — 99395 PREV VISIT EST AGE 18-39: CPT | Performed by: FAMILY MEDICINE

## 2024-10-21 RX ORDER — MEDROXYPROGESTERONE ACETATE 10 MG
TABLET ORAL
Start: 2024-10-21

## 2024-10-21 RX ORDER — MEDROXYPROGESTERONE ACETATE 10 MG
TABLET ORAL
Qty: 30 TABLET | Refills: 1 | Status: SHIPPED | OUTPATIENT
Start: 2024-10-21 | End: 2024-10-21

## 2024-10-21 NOTE — ASSESSMENT & PLAN NOTE
Normal breast and pelvic exam.  Pap smear done.  Discussed healthy diet and  importance of regular exercise. Advise low cholesterol diet, caloric balance, sufficient intake of fresh fruits and vegetables.

## 2024-10-21 NOTE — PROGRESS NOTES
Opal Prather is a 24 y.o. female.     History of Present Illness   The patient presents for annual GYN examination. The patient is complaining of irregular menstruation cycle. She has seen OB/GYN in the past and was prescribed medroxyprogesterone which she is takingIf no mentrual cycle within 60 days of 1st day of previous cycle. She denies vaginal discharge, hematuria, dysuria, abdominal pain, pelvic pain, breast mass or lumps. The patient notes that she performs self breast exams frequently and has no breast concerns.  LMP 10/12/2024.     The following portions of the patient's history were reviewed and updated as appropriate: past medical history, past social history, past surgical history and problem list.    Review of Systems   Constitutional:  Negative for fatigue.   Respiratory:  Negative for shortness of breath.    Cardiovascular:  Negative for chest pain and palpitations.   Gastrointestinal:  Negative for abdominal pain.   Genitourinary:  Negative for breast lump, breast pain, dysuria, pelvic pain, urgency and vaginal discharge.   Neurological:  Negative for headache.   Psychiatric/Behavioral:  Negative for sleep disturbance. The patient is not nervous/anxious.        Objective   Physical Exam  Vitals reviewed.   Constitutional:       Appearance: She is well-developed.   Neck:      Thyroid: No thyromegaly.   Cardiovascular:      Heart sounds: Normal heart sounds.   Pulmonary:      Effort: Pulmonary effort is normal.   Chest:   Breasts:     Breasts are symmetrical.      Right: No inverted nipple, mass, nipple discharge, skin change or tenderness.      Left: No inverted nipple, mass, nipple discharge, skin change or tenderness.   Abdominal:      Tenderness: There is no abdominal tenderness.   Genitourinary:     Vagina: Normal.      Cervix: Normal.      Uterus: Normal.       Adnexa: Right adnexa normal and left adnexa normal.   Neurological:      Mental Status: She is alert and oriented to person,  place, and time.   Psychiatric:         Mood and Affect: Mood normal.         Vitals:    10/21/24 1258   BP: 113/78   Pulse: 83   Resp: 16   Temp: 97.7 °F (36.5 °C)   SpO2: 97%     Current Outpatient Medications on File Prior to Visit   Medication Sig Dispense Refill    ferrous sulfate 325 (65 FE) MG tablet Take 1 tablet by mouth Daily With Breakfast. 90 tablet 1    folic acid (FOLVITE) 1 MG tablet Take 1 tablet by mouth Daily. 90 tablet 1    [DISCONTINUED] medroxyPROGESTERone (PROVERA) 10 MG tablet        No current facility-administered medications on file prior to visit.         Assessment & Plan   Problems Addressed this Visit       Encntr for gyn exam (general) (routine) w/o abn findings - Primary     Normal breast and pelvic exam.  Pap smear done.  Discussed healthy diet and  importance of regular exercise. Advise low cholesterol diet, caloric balance, sufficient intake of fresh fruits and vegetables.           Relevant Orders    IGP,Aptima HPV,Age Gdln    Irregular periods/menstrual cycles    Relevant Medications    medroxyPROGESTERone (PROVERA) 10 MG tablet    Other Relevant Orders    Ambulatory Referral to Obstetrics / Gynecology     Diagnoses         Codes Comments    Encntr for gyn exam (general) (routine) w/o abn findings    -  Primary ICD-10-CM: Z01.419  ICD-9-CM: V72.31     Irregular periods/menstrual cycles     ICD-10-CM: N92.6  ICD-9-CM: 626.4                    Answers submitted by the patient for this visit:  Other (Submitted on 10/14/2024)  Please describe your symptoms.: The Gynecologist that Dr. Zhu refered me to retired and I need a new Gynecologist and also a new exam to dissus if i should continue the medcation that Dr. Lakeshia Capps had prescribed me and if so a new prescription to refill the medication.  Have you had these symptoms before?: Yes  How long have you been having these symptoms?: Greater than 2 weeks  Please list any medications you are currently taking for this condition.:  MedroxyPROGESTERone 10 mg, , If no mentrual cycle within 60 days of 1st day of previous cycle take 1 pill daily for 10 days  Primary Reason for Visit (Submitted on 10/14/2024)  What is the primary reason for your visit?: Problem Not Listed

## 2024-10-25 LAB
AGE GDLN ACOG TESTING: NORMAL
CONV .: NORMAL
CYTOLOGIST CVX/VAG CYTO: NORMAL
CYTOLOGY CVX/VAG DOC CYTO: NORMAL
CYTOLOGY CVX/VAG DOC THIN PREP: NORMAL
DX ICD CODE: NORMAL
Lab: NORMAL
OTHER STN SPEC: NORMAL
STAT OF ADQ CVX/VAG CYTO-IMP: NORMAL

## 2024-12-18 NOTE — PROGRESS NOTES
HEMATOLOGY ONCOLOGY OUTPATIENT FOLLOW UP       Patient name: Barrington Prather  : 2000  MRN: [id number]  Primary Care Physician: Wilner Zhu MD  Referring Physician: Wilner Zhu MD  Reason For Consult:         History of Present Illness:  Barrington Prather is a 24 y.o.  female who has been referred to us for further evaluation and management of elevated platelet counts.  Most recent labs were reviewed and are as follows:    2024:  CBC: 10.6/14.4/47.6/505 [MCV 74.8, MCH 22.6]    On review of labs, patient is noted to have microcytic anemia with relatively normal Hb/HCT over past many years.  Noted to have mildly elevated platelet counts.  This development is of recent onset.    Patient reported that she has remote history of iron deficiency anemia, however she has not been on  any oral or IV iron supplements.    She has history of menorrhagia and irregular menses.  She is currently on OCPs for irregular cycles for the last 1-2 years.  No past medical history of abnormal bleeding.    Reported only occasional alcohol use, no smoking history.    Family history:   Sister: Thyroid Cancer (at age 21), unclear type.    Patient seen today for initial evaluation.  She has some mild arthralgias and some chronic fatigue.  Denied any other acute issues presently.    2024: WBC 10.88, hemoglobin 14.0, MCV 74.8, platelets 402,000.    JAK2 analysis negative for JAK2 V617F mutation, CALR mutation negative, BCR-ABL negative. No MPL mutation identified.       2024: Patient presents today for routine follow-up and lab review.  She reports overall feeling well and has no new complaints today.  She has not been taking folic acid or iron supplementation.  She states she did get a call from the pharmacy and will  her medications today and begin.      2024:Pt seen today for follow up. She is overall feeling well at this time.  On daily iron: mostly doing well. Occasional GI upsets  On oral folate  supplementation. Good tolerance.  No acute issues reported.    Subjective:  12/19/2024: Barrington is here today for her routine 3-month follow-up.  She reports that she is doing well.  She is currently on oral iron replacement and oral folate replacement.  She tolerates these without any significant issue, occasional nausea from oral iron.  Denies any signs or symptoms of bleeding other than menstruation.  She states that she is now on Provera to regulate her periods.  Feels some improvement in her energy level with the iron replacement.    Past Medical History:   Diagnosis Date    Iron deficiency        Past Surgical History:   Procedure Laterality Date    TONSILLECTOMY           Current Outpatient Medications:     ferrous sulfate 325 (65 FE) MG tablet, Take 1 tablet by mouth Daily With Breakfast., Disp: 90 tablet, Rfl: 1    folic acid (FOLVITE) 1 MG tablet, Take 1 tablet by mouth Daily., Disp: 90 tablet, Rfl: 1    medroxyPROGESTERone (PROVERA) 10 MG tablet, If no mentrual cycle within 60 days of 1st day of previous cycle take 1 pill daily for 10 days, Disp: , Rfl:     Allergies   Allergen Reactions    Amoxicillin Rash       Family History   Problem Relation Age of Onset    Hyperlipidemia Mother     Diabetes Father     Other Sister        Cancer-related family history is not on file.    Social History     Tobacco Use    Smoking status: Never     Passive exposure: Never    Smokeless tobacco: Never   Vaping Use    Vaping status: Never Used   Substance Use Topics    Alcohol use: Never    Drug use: Never     Social History     Social History Narrative    Not on file        Visit date not found.    ROS:   Review of Systems   Constitutional: Negative.    HENT: Negative.     Respiratory: Negative.     Cardiovascular: Negative.    Gastrointestinal: Negative.    Musculoskeletal: Negative.    Neurological: Negative.    Hematological: Negative.        Objective:  Vital signs:  Vitals:    12/19/24 1054   BP: 117/74   Pulse: 78  "  Temp: 98 °F (36.7 °C)   SpO2: 96%   Weight: 118 kg (261 lb)   Height: 161.3 cm (63.5\")   PainSc: 0-No pain           Body mass index is 45.5 kg/m².  ECOG  (0) Fully active, able to carry on all predisease performance without restriction    Physical Exam:   Physical Exam  Vitals reviewed.   Constitutional:       Appearance: Normal appearance.   HENT:      Head: Normocephalic and atraumatic.   Eyes:      Pupils: Pupils are equal, round, and reactive to light.   Cardiovascular:      Rate and Rhythm: Normal rate and regular rhythm.   Pulmonary:      Effort: Pulmonary effort is normal.      Breath sounds: Normal breath sounds.   Abdominal:      General: There is no distension.      Palpations: Abdomen is soft. There is no mass.      Tenderness: There is no abdominal tenderness.   Musculoskeletal:         General: Normal range of motion.      Cervical back: Normal range of motion and neck supple.   Skin:     General: Skin is warm.   Neurological:      General: No focal deficit present.      Mental Status: She is alert.   Psychiatric:         Mood and Affect: Mood normal.         Lab Results - Last 18 Months   Lab Units 12/19/24  1036 09/19/24  0948 07/16/24  0928   WBC 10*3/mm3 10.32 9.89 10.88*   HEMOGLOBIN g/dL 14.0 14.0 14.0   HEMATOCRIT % 46.1 46.2 45.3   PLATELETS 10*3/mm3 414 460* 402   MCV fL 77.1* 76.0* 74.8*     Lab Results - Last 18 Months   Lab Units 07/16/24  0928 06/14/24  1132   SODIUM mmol/L 140 140   POTASSIUM mmol/L 4.4 4.3   CHLORIDE mmol/L 104 104   CO2 mmol/L 27.0 25.7   BUN mg/dL 9 9   CREATININE mg/dL 0.71 0.80   CALCIUM mg/dL 9.1 9.2   BILIRUBIN mg/dL 0.3 0.4   ALK PHOS U/L 76 76   ALT (SGPT) U/L 19 20   AST (SGOT) U/L 17 12   GLUCOSE mg/dL 114* 100*       Lab Results   Component Value Date    GLUCOSE 114 (H) 07/16/2024    BUN 9 07/16/2024    CREATININE 0.71 07/16/2024    EGFRIFNONA NOT CALCULATED 03/08/2016    EGFRIFAFRI NOT CALCULATED 03/08/2016    BCR 12.7 07/16/2024    K 4.4 07/16/2024    CO2 " "27.0 07/16/2024    CALCIUM 9.1 07/16/2024    ALBUMIN 4.2 07/16/2024    LABIL2 1.1 08/08/2017    AST 17 07/16/2024    ALT 19 07/16/2024       No results for input(s): \"APTT\", \"INR\", \"PTT\" in the last 25876 hours.    Lab Results   Component Value Date    IRON 76 09/19/2024    TIBC 431 09/19/2024    FERRITIN 122.00 09/19/2024       Lab Results   Component Value Date    FOLATE 4.16 (L) 07/16/2024       No results found for: \"OCCULTBLD\"    No results found for: \"RETICCTPCT\"  Lab Results   Component Value Date    UDADAANB37 397 07/16/2024     No results found for: \"SPEP\", \"UPEP\"  No results found for: \"LDH\", \"URICACID\"  No results found for: \"JOANNE\", \"RF\", \"SEDRATE\"  No results found for: \"FIBRINOGEN\", \"HAPTOGLOBIN\"  No results found for: \"PTT\", \"INR\"  No results found for: \"\"  No results found for: \"CEA\"  No components found for: \"CA-19-9\"  No results found for: \"PSA\"  No results found for: \"SEDRATE\"    Assessment & Plan       Iron deficiency:  Thrombocytosis:  Leukocytosis:  -Outside labs reviewed, noted to have chronic microcytosis suggestive of ongoing iron deficiency anemia.  However low TSAT 12% is indicative of some iron deficiency anemia.  -Thrombocytosis appears likely reactive secondary to iron deficiency anemia, however given young age will perform workup to rule out MPN's and CML.  This has been reported Negative.  -started her on oral iron supplementation. Noted to have improvement in Hb/Hct and microcytosis.  -Platelet count has now normalized on oral iron, 414,000  -Recheck reticulocyte, iron profile and ferritin today    Folate deficiency:   Patient was noted to have low folate levels.  started her on oral supplementation.   She is tolerating well. Continue.  Recheck B12 and folate today    Follow-up in 3 months with Dr. Naranjo with repeat labs or sooner if needed    Thank you very much for providing the opportunity to participate in this patient’s care. Please do not hesitate to call if there are any " other questions.

## 2024-12-19 ENCOUNTER — OFFICE VISIT (OUTPATIENT)
Dept: ONCOLOGY | Facility: CLINIC | Age: 24
End: 2024-12-19
Payer: COMMERCIAL

## 2024-12-19 ENCOUNTER — LAB (OUTPATIENT)
Dept: LAB | Facility: HOSPITAL | Age: 24
End: 2024-12-19
Payer: COMMERCIAL

## 2024-12-19 VITALS
BODY MASS INDEX: 44.56 KG/M2 | TEMPERATURE: 98 F | HEART RATE: 78 BPM | HEIGHT: 64 IN | WEIGHT: 261 LBS | DIASTOLIC BLOOD PRESSURE: 74 MMHG | OXYGEN SATURATION: 96 % | SYSTOLIC BLOOD PRESSURE: 117 MMHG

## 2024-12-19 DIAGNOSIS — D75.839 THROMBOCYTOSIS: Primary | ICD-10-CM

## 2024-12-19 DIAGNOSIS — D75.839 THROMBOCYTOSIS: ICD-10-CM

## 2024-12-19 DIAGNOSIS — E53.8 FOLATE DEFICIENCY: ICD-10-CM

## 2024-12-19 DIAGNOSIS — D50.0 IRON DEFICIENCY ANEMIA DUE TO CHRONIC BLOOD LOSS: ICD-10-CM

## 2024-12-19 LAB
BASOPHILS # BLD AUTO: 0.03 10*3/MM3 (ref 0–0.2)
BASOPHILS NFR BLD AUTO: 0.3 % (ref 0–1.5)
DEPRECATED RDW RBC AUTO: 45.9 FL (ref 37–54)
EOSINOPHIL # BLD AUTO: 0.37 10*3/MM3 (ref 0–0.4)
EOSINOPHIL NFR BLD AUTO: 3.6 % (ref 0.3–6.2)
ERYTHROCYTE [DISTWIDTH] IN BLOOD BY AUTOMATED COUNT: 16.9 % (ref 12.3–15.4)
FERRITIN SERPL-MCNC: 167 NG/ML (ref 13–150)
FOLATE SERPL-MCNC: 12.5 NG/ML (ref 4.78–24.2)
HCT VFR BLD AUTO: 46.1 % (ref 34–46.6)
HGB BLD-MCNC: 14 G/DL (ref 12–15.9)
IRON 24H UR-MRATE: 81 MCG/DL (ref 37–145)
IRON SATN MFR SERPL: 21 % (ref 20–50)
LYMPHOCYTES # BLD AUTO: 3.51 10*3/MM3 (ref 0.7–3.1)
LYMPHOCYTES NFR BLD AUTO: 34 % (ref 19.6–45.3)
MCH RBC QN AUTO: 23.4 PG (ref 26.6–33)
MCHC RBC AUTO-ENTMCNC: 30.4 G/DL (ref 31.5–35.7)
MCV RBC AUTO: 77.1 FL (ref 79–97)
MONOCYTES # BLD AUTO: 0.58 10*3/MM3 (ref 0.1–0.9)
MONOCYTES NFR BLD AUTO: 5.6 % (ref 5–12)
NEUTROPHILS NFR BLD AUTO: 5.83 10*3/MM3 (ref 1.7–7)
NEUTROPHILS NFR BLD AUTO: 56.5 % (ref 42.7–76)
PLATELET # BLD AUTO: 414 10*3/MM3 (ref 140–450)
PMV BLD AUTO: 9.5 FL (ref 6–12)
RBC # BLD AUTO: 5.98 10*6/MM3 (ref 3.77–5.28)
RETICS # AUTO: 0.1 10*6/MM3 (ref 0.02–0.13)
RETICS/RBC NFR AUTO: 1.67 % (ref 0.7–1.9)
TIBC SERPL-MCNC: 387 MCG/DL (ref 298–536)
TRANSFERRIN SERPL-MCNC: 260 MG/DL (ref 200–360)
VIT B12 BLD-MCNC: 396 PG/ML (ref 211–946)
WBC NRBC COR # BLD AUTO: 10.32 10*3/MM3 (ref 3.4–10.8)

## 2024-12-19 PROCEDURE — 99214 OFFICE O/P EST MOD 30 MIN: CPT | Performed by: NURSE PRACTITIONER

## 2024-12-19 PROCEDURE — 82746 ASSAY OF FOLIC ACID SERUM: CPT | Performed by: STUDENT IN AN ORGANIZED HEALTH CARE EDUCATION/TRAINING PROGRAM

## 2024-12-19 PROCEDURE — 84466 ASSAY OF TRANSFERRIN: CPT | Performed by: STUDENT IN AN ORGANIZED HEALTH CARE EDUCATION/TRAINING PROGRAM

## 2024-12-19 PROCEDURE — 85025 COMPLETE CBC W/AUTO DIFF WBC: CPT

## 2024-12-19 PROCEDURE — 83540 ASSAY OF IRON: CPT | Performed by: STUDENT IN AN ORGANIZED HEALTH CARE EDUCATION/TRAINING PROGRAM

## 2024-12-19 PROCEDURE — 85045 AUTOMATED RETICULOCYTE COUNT: CPT | Performed by: STUDENT IN AN ORGANIZED HEALTH CARE EDUCATION/TRAINING PROGRAM

## 2024-12-19 PROCEDURE — 82728 ASSAY OF FERRITIN: CPT | Performed by: STUDENT IN AN ORGANIZED HEALTH CARE EDUCATION/TRAINING PROGRAM

## 2024-12-19 PROCEDURE — 82607 VITAMIN B-12: CPT | Performed by: STUDENT IN AN ORGANIZED HEALTH CARE EDUCATION/TRAINING PROGRAM

## 2024-12-19 PROCEDURE — 36415 COLL VENOUS BLD VENIPUNCTURE: CPT

## 2024-12-20 ENCOUNTER — TELEPHONE (OUTPATIENT)
Dept: ONCOLOGY | Facility: CLINIC | Age: 24
End: 2024-12-20
Payer: COMMERCIAL

## 2024-12-20 NOTE — TELEPHONE ENCOUNTER
----- Message from Jocy LAST sent at 12/19/2024  4:29 PM EST -----    ----- Message -----  From: Sarah Michaels APRN  Sent: 12/19/2024   4:19 PM EST  To: Jocy Barker MA; Soraya Marsh RN    Iron levels look well replaced.  She can decrease her iron to every other day. Ty

## 2025-01-23 ENCOUNTER — TELEMEDICINE (OUTPATIENT)
Dept: FAMILY MEDICINE CLINIC | Facility: CLINIC | Age: 25
End: 2025-01-23
Payer: COMMERCIAL

## 2025-01-23 DIAGNOSIS — J06.9 ACUTE URI: Primary | ICD-10-CM

## 2025-01-23 PROCEDURE — 99213 OFFICE O/P EST LOW 20 MIN: CPT | Performed by: FAMILY MEDICINE

## 2025-01-23 RX ORDER — AZITHROMYCIN 250 MG/1
TABLET, FILM COATED ORAL
Qty: 6 TABLET | Refills: 0 | Status: SHIPPED | OUTPATIENT
Start: 2025-01-23

## 2025-01-23 RX ORDER — BENZONATATE 200 MG/1
200 CAPSULE ORAL 3 TIMES DAILY PRN
Qty: 20 CAPSULE | Refills: 0 | Status: SHIPPED | OUTPATIENT
Start: 2025-01-23

## 2025-01-23 NOTE — ASSESSMENT & PLAN NOTE
Symptomatic therapy suggested: antihistamine-decongestant of choice. increase fluids,  tylenol as needed, rest and take medicine as prescribed.  Call us back if symptoms do not get better or worsen.     This is a video visit.  [employer] kiko was used to complete this visit.  Total time of discussion was 20-minutes.

## 2025-01-23 NOTE — PROGRESS NOTES
Subjective   Barrington Prather is a 24 y.o. female.     History of Present Illness  You have chosen to receive care through a telehealth visit.  Do you consent to use a video/audio connection for your medical care today? Yes  Telehealth visit in view of COVID-19 pandemic.  Patient was at home and I was at [facility] clinic.  I introduced myself and identified the patient's identity.  24-year-old female patient present with complaint of cough, congestion, sinus drainage and bodyaches.  Patient had home COVID test done which came back negative.      URI   This is a new problem. The current episode started in the past 7 days. The problem has been gradually worsening. There has been no fever. Associated symptoms include congestion, coughing and rhinorrhea. Pertinent negatives include no abdominal pain, diarrhea, ear pain, headaches, nausea, sinus pain, sore throat, vomiting or wheezing. She has tried decongestant and increased fluids for the symptoms. The treatment provided no relief.        The following portions of the patient's history were reviewed and updated as appropriate: past medical history, past social history, past surgical history and problem list.    Review of Systems   Constitutional:  Positive for fatigue. Negative for fever.   HENT:  Positive for congestion, postnasal drip, rhinorrhea and sinus pressure. Negative for ear pain and sore throat.    Respiratory:  Positive for cough. Negative for shortness of breath and wheezing.    Gastrointestinal:  Negative for abdominal pain, diarrhea, nausea and vomiting.       Objective   Physical Exam  Vitals reviewed.   Pulmonary:      Effort: Pulmonary effort is normal.   Neurological:      Mental Status: She is alert and oriented to person, place, and time.         There were no vitals filed for this visit.  Current Outpatient Medications on File Prior to Visit   Medication Sig Dispense Refill    ferrous sulfate 325 (65 FE) MG tablet Take 1 tablet by mouth  Daily With Breakfast. 90 tablet 1    folic acid (FOLVITE) 1 MG tablet Take 1 tablet by mouth Daily. 90 tablet 1    medroxyPROGESTERone (PROVERA) 10 MG tablet If no mentrual cycle within 60 days of 1st day of previous cycle take 1 pill daily for 10 days       No current facility-administered medications on file prior to visit.         Assessment & Plan   Problems Addressed this Visit       Acute URI - Primary     Symptomatic therapy suggested: antihistamine-decongestant of choice. increase fluids,  tylenol as needed, rest and take medicine as prescribed.  Call us back if symptoms do not get better or worsen.     This is a video visit.  [employer] kiko was used to complete this visit.  Total time of discussion was 20-minutes.         Relevant Medications    azithromycin (Zithromax Z-Nils) 250 MG tablet    benzonatate (TESSALON) 200 MG capsule     Diagnoses         Codes Comments    Acute URI    -  Primary ICD-10-CM: J06.9  ICD-9-CM: 465.9

## 2025-02-09 PROBLEM — J20.9 ACUTE BRONCHITIS: Status: ACTIVE | Noted: 2025-02-09

## 2025-02-17 RX ORDER — FOLIC ACID 1 MG/1
1 TABLET ORAL DAILY
Qty: 90 TABLET | Refills: 1 | Status: SHIPPED | OUTPATIENT
Start: 2025-02-17

## 2025-03-20 ENCOUNTER — LAB (OUTPATIENT)
Dept: LAB | Facility: HOSPITAL | Age: 25
End: 2025-03-20
Payer: COMMERCIAL

## 2025-03-20 ENCOUNTER — OFFICE VISIT (OUTPATIENT)
Dept: ONCOLOGY | Facility: CLINIC | Age: 25
End: 2025-03-20
Payer: COMMERCIAL

## 2025-03-20 VITALS
OXYGEN SATURATION: 93 % | DIASTOLIC BLOOD PRESSURE: 95 MMHG | SYSTOLIC BLOOD PRESSURE: 137 MMHG | BODY MASS INDEX: 47.06 KG/M2 | HEART RATE: 87 BPM | HEIGHT: 63 IN | WEIGHT: 265.6 LBS

## 2025-03-20 DIAGNOSIS — D50.0 IRON DEFICIENCY ANEMIA DUE TO CHRONIC BLOOD LOSS: ICD-10-CM

## 2025-03-20 DIAGNOSIS — D75.839 THROMBOCYTOSIS: ICD-10-CM

## 2025-03-20 DIAGNOSIS — E53.8 FOLATE DEFICIENCY: ICD-10-CM

## 2025-03-20 DIAGNOSIS — D56.4 HPFH (HEREDITARY PERSISTENCE OF FETAL HEMOGLOBIN): ICD-10-CM

## 2025-03-20 DIAGNOSIS — D50.0 IRON DEFICIENCY ANEMIA DUE TO CHRONIC BLOOD LOSS: Primary | ICD-10-CM

## 2025-03-20 LAB
ALBUMIN SERPL-MCNC: 4.3 G/DL (ref 3.5–5.2)
ALBUMIN/GLOB SERPL: 1.3 G/DL
ALP SERPL-CCNC: 73 U/L (ref 39–117)
ALT SERPL W P-5'-P-CCNC: 21 U/L (ref 1–33)
ANION GAP SERPL CALCULATED.3IONS-SCNC: 9.2 MMOL/L (ref 5–15)
AST SERPL-CCNC: 19 U/L (ref 1–32)
BASOPHILS # BLD AUTO: 0.05 10*3/MM3 (ref 0–0.2)
BASOPHILS NFR BLD AUTO: 0.5 % (ref 0–1.5)
BILIRUB SERPL-MCNC: 0.3 MG/DL (ref 0–1.2)
BUN SERPL-MCNC: 11 MG/DL (ref 6–20)
BUN/CREAT SERPL: 13.9 (ref 7–25)
CALCIUM SPEC-SCNC: 9.4 MG/DL (ref 8.6–10.5)
CHLORIDE SERPL-SCNC: 102 MMOL/L (ref 98–107)
CO2 SERPL-SCNC: 27.8 MMOL/L (ref 22–29)
CREAT SERPL-MCNC: 0.79 MG/DL (ref 0.57–1)
DEPRECATED RDW RBC AUTO: 44.9 FL (ref 37–54)
EGFRCR SERPLBLD CKD-EPI 2021: 106.6 ML/MIN/1.73
EOSINOPHIL # BLD AUTO: 0.63 10*3/MM3 (ref 0–0.4)
EOSINOPHIL NFR BLD AUTO: 5.7 % (ref 0.3–6.2)
ERYTHROCYTE [DISTWIDTH] IN BLOOD BY AUTOMATED COUNT: 16.2 % (ref 12.3–15.4)
FERRITIN SERPL-MCNC: 123 NG/ML (ref 13–150)
FOLATE SERPL-MCNC: 12.1 NG/ML (ref 4.78–24.2)
GLOBULIN UR ELPH-MCNC: 3.2 GM/DL
GLUCOSE SERPL-MCNC: 104 MG/DL (ref 65–99)
HCT VFR BLD AUTO: 46.3 % (ref 34–46.6)
HGB BLD-MCNC: 14.1 G/DL (ref 12–15.9)
IRON 24H UR-MRATE: 55 MCG/DL (ref 37–145)
IRON SATN MFR SERPL: 13 % (ref 20–50)
LYMPHOCYTES # BLD AUTO: 3.99 10*3/MM3 (ref 0.7–3.1)
LYMPHOCYTES NFR BLD AUTO: 36 % (ref 19.6–45.3)
MCH RBC QN AUTO: 23.9 PG (ref 26.6–33)
MCHC RBC AUTO-ENTMCNC: 30.5 G/DL (ref 31.5–35.7)
MCV RBC AUTO: 78.3 FL (ref 79–97)
MONOCYTES # BLD AUTO: 0.64 10*3/MM3 (ref 0.1–0.9)
MONOCYTES NFR BLD AUTO: 5.8 % (ref 5–12)
NEUTROPHILS NFR BLD AUTO: 5.76 10*3/MM3 (ref 1.7–7)
NEUTROPHILS NFR BLD AUTO: 52 % (ref 42.7–76)
PLATELET # BLD AUTO: 400 10*3/MM3 (ref 140–450)
PMV BLD AUTO: 9.4 FL (ref 6–12)
POTASSIUM SERPL-SCNC: 4.3 MMOL/L (ref 3.5–5.2)
PROT SERPL-MCNC: 7.5 G/DL (ref 6–8.5)
RBC # BLD AUTO: 5.91 10*6/MM3 (ref 3.77–5.28)
SODIUM SERPL-SCNC: 139 MMOL/L (ref 136–145)
TIBC SERPL-MCNC: 419 MCG/DL (ref 298–536)
TRANSFERRIN SERPL-MCNC: 281 MG/DL (ref 200–360)
VIT B12 BLD-MCNC: 524 PG/ML (ref 211–946)
WBC NRBC COR # BLD AUTO: 11.07 10*3/MM3 (ref 3.4–10.8)

## 2025-03-20 PROCEDURE — 80053 COMPREHEN METABOLIC PANEL: CPT | Performed by: NURSE PRACTITIONER

## 2025-03-20 PROCEDURE — 99214 OFFICE O/P EST MOD 30 MIN: CPT | Performed by: STUDENT IN AN ORGANIZED HEALTH CARE EDUCATION/TRAINING PROGRAM

## 2025-03-20 PROCEDURE — 82607 VITAMIN B-12: CPT | Performed by: NURSE PRACTITIONER

## 2025-03-20 PROCEDURE — 83540 ASSAY OF IRON: CPT | Performed by: NURSE PRACTITIONER

## 2025-03-20 PROCEDURE — 85025 COMPLETE CBC W/AUTO DIFF WBC: CPT

## 2025-03-20 PROCEDURE — 82728 ASSAY OF FERRITIN: CPT | Performed by: NURSE PRACTITIONER

## 2025-03-20 PROCEDURE — 36415 COLL VENOUS BLD VENIPUNCTURE: CPT

## 2025-03-20 PROCEDURE — 84466 ASSAY OF TRANSFERRIN: CPT | Performed by: NURSE PRACTITIONER

## 2025-03-20 PROCEDURE — 82746 ASSAY OF FOLIC ACID SERUM: CPT | Performed by: NURSE PRACTITIONER

## 2025-03-21 LAB
HGB A MFR BLD ELPH: 93.8 % (ref 96.4–98.8)
HGB A2 MFR BLD ELPH: 2.7 % (ref 1.8–3.2)
HGB F MFR BLD ELPH: 3.5 % (ref 0–2)
HGB FRACT BLD-IMP: ABNORMAL
HGB S MFR BLD ELPH: 0 %

## 2025-06-17 ENCOUNTER — TELEPHONE (OUTPATIENT)
Dept: ONCOLOGY | Facility: CLINIC | Age: 25
End: 2025-06-17

## 2025-06-17 NOTE — TELEPHONE ENCOUNTER
Caller: Barrington Prather    Relationship: Self    Best call back number: [phone]    What orders are you requesting (i.e. lab or imaging): TB SKIN TEST    OR QUANTIFERON TB GOLD PLUS TEST OR T SPOT TEST     In what timeframe would the patient need to come in: 07/01/25    Where will you receive your lab/imaging services: [facility]     Additional notes: WANTED TO SEE IF THIS LAB CAN BE ADDED ON IS IN THE NURSING PROGRAM AT  AND NEEDING TO GET TB INFORMATION UPDATED.

## 2025-06-30 NOTE — PROGRESS NOTES
HEMATOLOGY ONCOLOGY OUTPATIENT FOLLOW UP       Patient name: Barrington Prather  : 2000  MRN: [id number]  Primary Care Physician: Wilner Zhu MD  Referring Physician: No ref. provider found  Reason For Consult:         History of Present Illness:  Barrington Prather is a 25 y.o.  female who has been referred to us for further evaluation and management of elevated platelet counts.  Most recent labs were reviewed and are as follows:    2024:  CBC: 10.6/14.4/47.6/505 [MCV 74.8, MCH 22.6]    On review of labs, patient is noted to have microcytic anemia with relatively normal Hb/HCT over past many years.  Noted to have mildly elevated platelet counts.  This development is of recent onset.    Patient reported that she has remote history of iron deficiency anemia, however she has not been on  any oral or IV iron supplements.    She has history of menorrhagia and irregular menses.  She is currently on OCPs for irregular cycles for the last 1-2 years.  No past medical history of abnormal bleeding.    Reported only occasional alcohol use, no smoking history.    Family history:   Sister: Thyroid Cancer (at age 21), unclear type.    Patient seen today for initial evaluation.  She has some mild arthralgias and some chronic fatigue.  Denied any other acute issues presently.    2024: WBC 10.88, hemoglobin 14.0, MCV 74.8, platelets 402,000.    JAK2 analysis negative for JAK2 V617F mutation, CALR mutation negative, BCR-ABL negative. No MPL mutation identified.       2024: Patient presents today for routine follow-up and lab review.  She reports overall feeling well and has no new complaints today.  She has not been taking folic acid or iron supplementation.  She states she did get a call from the pharmacy and will  her medications today and begin.      2024:Pt seen today for follow up. She is overall feeling well at this time.  On daily iron: mostly doing well. Occasional GI upsets  On oral folate  supplementation. Good tolerance.  No acute issues reported.    12/19/2024: Barrington is here today for her routine 3-month follow-up.  She reports that she is doing well.  She is currently on oral iron replacement and oral folate replacement.  She tolerates these without any significant issue, occasional nausea from oral iron.  Denies any signs or symptoms of bleeding other than menstruation.  She states that she is now on Provera to regulate her periods.  Feels some improvement in her energy level with the iron replacement.    3/20/25: pt seen for follow up. Has some fatigue but overall clinically stable. Denied any menorrhagia at this time.     Subjective:  7/1/2025: Giovanny is here today for routine follow-up. Continues to take oral iron every other day and folic acid replacement daily.  She does occasionally forget the oral iron due to the every other day dosing.  Continues on Provera to regulate her menstrual cycles and outside of menstruation denies any signs or symptoms of blood loss.  Overall feels her energy level is stable.    Past Medical History:   Diagnosis Date    Iron deficiency        Past Surgical History:   Procedure Laterality Date    TONSILLECTOMY           Current Outpatient Medications:     ferrous sulfate 325 (65 FE) MG tablet, Take 1 tablet by mouth Daily With Breakfast., Disp: 90 tablet, Rfl: 1    folic acid (FOLVITE) 1 MG tablet, TAKE 1 TABLET BY MOUTH DAILY, Disp: 90 tablet, Rfl: 1    medroxyPROGESTERone (PROVERA) 10 MG tablet, If no mentrual cycle within 60 days of 1st day of previous cycle take 1 pill daily for 10 days, Disp: , Rfl:     Allergies   Allergen Reactions    Amoxicillin Rash       Family History   Problem Relation Age of Onset    Hyperlipidemia Mother     Diabetes Father     Other Sister        Cancer-related family history is not on file.    Social History     Tobacco Use    Smoking status: Never     Passive exposure: Never    Smokeless tobacco: Never   Vaping Use    Vaping  "status: Never Used   Substance Use Topics    Alcohol use: Never    Drug use: Never     Social History     Social History Narrative    Not on file        Visit date not found.    ROS:   Review of Systems   Constitutional: Negative.    HENT: Negative.     Respiratory: Negative.     Cardiovascular: Negative.    Gastrointestinal: Negative.    Musculoskeletal: Negative.    Neurological: Negative.    Hematological: Negative.        Objective:  Vital signs:  Vitals:    07/01/25 0927   BP: 109/76   Pulse: 97   Temp: 97.2 °F (36.2 °C)   SpO2: 95%   Weight: 122 kg (268 lb)   Height: 160 cm (62.99\")   PainSc: 0-No pain             Body mass index is 47.49 kg/m².  ECOG  (0) Fully active, able to carry on all predisease performance without restriction    Physical Exam:   Physical Exam  Vitals reviewed.   Constitutional:       General: She is not in acute distress.     Appearance: She is not ill-appearing.   HENT:      Head: Normocephalic and atraumatic.   Eyes:      General: No scleral icterus.     Extraocular Movements: Extraocular movements intact.   Cardiovascular:      Rate and Rhythm: Normal rate and regular rhythm.   Pulmonary:      Effort: Pulmonary effort is normal. No respiratory distress.   Abdominal:      General: There is no distension.      Palpations: Abdomen is soft.   Musculoskeletal:      Cervical back: Normal range of motion.      Right lower leg: No edema.      Left lower leg: No edema.   Skin:     General: Skin is warm.      Coloration: Skin is not jaundiced or pale.      Findings: No bruising, erythema or rash.   Neurological:      Mental Status: She is alert.   Psychiatric:         Mood and Affect: Mood normal.         Behavior: Behavior normal.         Lab Results - Last 18 Months   Lab Units 07/01/25  0922 03/20/25  1115 12/19/24  1036   WBC 10*3/mm3 9.72 11.07* 10.32   HEMOGLOBIN g/dL 13.5 14.1 14.0   HEMATOCRIT % 44.9 46.3 46.1   PLATELETS 10*3/mm3 420 400 414   MCV fL 77.5* 78.3* 77.1*     Lab Results " "- Last 18 Months   Lab Units 07/01/25  0922 03/20/25  1115 07/16/24  0928   SODIUM mmol/L 139 139 140   POTASSIUM mmol/L 4.0 4.3 4.4   CHLORIDE mmol/L 104 102 104   CO2 mmol/L 26.3 27.8 27.0   BUN mg/dL 11.9 11 9   CREATININE mg/dL 0.86 0.79 0.71   CALCIUM mg/dL 9.2 9.4 9.1   BILIRUBIN mg/dL 0.3 0.3 0.3   ALK PHOS U/L 84 73 76   ALT (SGPT) U/L 22 21 19   AST (SGOT) U/L 18 19 17   GLUCOSE mg/dL 134* 104* 114*       Lab Results   Component Value Date    GLUCOSE 134 (H) 07/01/2025    BUN 11.9 07/01/2025    CREATININE 0.86 07/01/2025    EGFRIFNONA NOT CALCULATED 03/08/2016    EGFRIFAFRI NOT CALCULATED 03/08/2016    BCR 13.8 07/01/2025    K 4.0 07/01/2025    CO2 26.3 07/01/2025    CALCIUM 9.2 07/01/2025    ALBUMIN 4.2 07/01/2025    AST 18 07/01/2025    ALT 22 07/01/2025       No results for input(s): \"APTT\", \"INR\", \"PTT\" in the last 17885 hours.    Lab Results   Component Value Date    IRON 58 07/01/2025    TIBC 383 07/01/2025    FERRITIN 137.00 07/01/2025       Lab Results   Component Value Date    FOLATE 12.10 03/20/2025       No results found for: \"OCCULTBLD\"    Lab Results   Component Value Date    RETICCTPCT 1.67 12/19/2024     Lab Results   Component Value Date    VGLVYUKH32 524 03/20/2025     No results found for: \"SPEP\", \"UPEP\"  No results found for: \"LDH\", \"URICACID\"  No results found for: \"JOANNE\", \"RF\", \"SEDRATE\"  No results found for: \"FIBRINOGEN\", \"HAPTOGLOBIN\"  No results found for: \"PTT\", \"INR\"  No results found for: \"\"  No results found for: \"CEA\"  No components found for: \"CA-19-9\"  No results found for: \"PSA\"  No results found for: \"SEDRATE\"    Assessment & Plan       Iron deficiency:  Thrombocytosis:  Leukocytosis:  -Outside labs reviewed, noted to have chronic microcytosis suggestive of ongoing iron deficiency anemia.  However low TSAT 12% is indicative of some iron deficiency anemia.  -Thrombocytosis appears likely reactive secondary to iron deficiency anemia, however given young age will perform " workup to rule out MPN's and CML.  This has been reported Negative.  -started her on oral iron supplementation. Noted to have improvement in Hb/Hct and microcytosis.  -Platelet count has now normalized on oral iron, 400K today.  -CBC today with continued normal hemoglobin.  Iron profile, ferritin B12 and folate ordered and pending  - Continue current oral iron supplementation    Mild elevation of HbF levels : Suspected HPHF:  -checked Hemoglobin Electrophoresis in view of persistent microcytosis, no concern for thalassemia but mild elevation noted in HbF (3%).  This is not of any clinical significance. Will monitor.    Folate deficiency:   Patient was noted to have low folate levels.  started her on oral supplementation.   She is tolerating well. Continue.  Recheck B12 and folate today    Follow-up in 4 months with Dr. Naranjo with repeat labs or sooner if needed    Thank you very much for providing the opportunity to participate in this patient’s care. Please do not hesitate to call if there are any other questions.

## 2025-07-01 ENCOUNTER — RESULTS FOLLOW-UP (OUTPATIENT)
Dept: LAB | Facility: HOSPITAL | Age: 25
End: 2025-07-01
Payer: COMMERCIAL

## 2025-07-01 ENCOUNTER — OFFICE VISIT (OUTPATIENT)
Dept: ONCOLOGY | Facility: CLINIC | Age: 25
End: 2025-07-01
Payer: COMMERCIAL

## 2025-07-01 ENCOUNTER — LAB (OUTPATIENT)
Dept: LAB | Facility: HOSPITAL | Age: 25
End: 2025-07-01
Payer: COMMERCIAL

## 2025-07-01 VITALS
DIASTOLIC BLOOD PRESSURE: 76 MMHG | WEIGHT: 268 LBS | SYSTOLIC BLOOD PRESSURE: 109 MMHG | BODY MASS INDEX: 47.48 KG/M2 | OXYGEN SATURATION: 95 % | HEIGHT: 63 IN | TEMPERATURE: 97.2 F | HEART RATE: 97 BPM

## 2025-07-01 DIAGNOSIS — E53.8 FOLATE DEFICIENCY: ICD-10-CM

## 2025-07-01 DIAGNOSIS — D50.0 IRON DEFICIENCY ANEMIA DUE TO CHRONIC BLOOD LOSS: Primary | ICD-10-CM

## 2025-07-01 DIAGNOSIS — D75.839 THROMBOCYTOSIS: ICD-10-CM

## 2025-07-01 DIAGNOSIS — D50.0 IRON DEFICIENCY ANEMIA DUE TO CHRONIC BLOOD LOSS: ICD-10-CM

## 2025-07-01 DIAGNOSIS — D56.4 HPFH (HEREDITARY PERSISTENCE OF FETAL HEMOGLOBIN): ICD-10-CM

## 2025-07-01 LAB
ALBUMIN SERPL-MCNC: 4.2 G/DL (ref 3.5–5.2)
ALBUMIN/GLOB SERPL: 1.3 G/DL
ALP SERPL-CCNC: 84 U/L (ref 39–117)
ALT SERPL W P-5'-P-CCNC: 22 U/L (ref 1–33)
ANION GAP SERPL CALCULATED.3IONS-SCNC: 8.7 MMOL/L (ref 5–15)
AST SERPL-CCNC: 18 U/L (ref 1–32)
BASOPHILS # BLD AUTO: 0.04 10*3/MM3 (ref 0–0.2)
BASOPHILS NFR BLD AUTO: 0.4 % (ref 0–1.5)
BILIRUB SERPL-MCNC: 0.3 MG/DL (ref 0–1.2)
BUN SERPL-MCNC: 11.9 MG/DL (ref 6–20)
BUN/CREAT SERPL: 13.8 (ref 7–25)
CALCIUM SPEC-SCNC: 9.2 MG/DL (ref 8.6–10.5)
CHLORIDE SERPL-SCNC: 104 MMOL/L (ref 98–107)
CO2 SERPL-SCNC: 26.3 MMOL/L (ref 22–29)
CREAT SERPL-MCNC: 0.86 MG/DL (ref 0.57–1)
DEPRECATED RDW RBC AUTO: 44 FL (ref 37–54)
EGFRCR SERPLBLD CKD-EPI 2021: 95.7 ML/MIN/1.73
EOSINOPHIL # BLD AUTO: 0.54 10*3/MM3 (ref 0–0.4)
EOSINOPHIL NFR BLD AUTO: 5.6 % (ref 0.3–6.2)
ERYTHROCYTE [DISTWIDTH] IN BLOOD BY AUTOMATED COUNT: 15.9 % (ref 12.3–15.4)
FERRITIN SERPL-MCNC: 137 NG/ML (ref 13–150)
FOLATE SERPL-MCNC: 5.99 NG/ML (ref 4.78–24.2)
GLOBULIN UR ELPH-MCNC: 3.3 GM/DL
GLUCOSE SERPL-MCNC: 134 MG/DL (ref 65–99)
HCT VFR BLD AUTO: 44.9 % (ref 34–46.6)
HGB BLD-MCNC: 13.5 G/DL (ref 12–15.9)
IRON 24H UR-MRATE: 58 MCG/DL (ref 37–145)
IRON SATN MFR SERPL: 15 % (ref 20–50)
LYMPHOCYTES # BLD AUTO: 3.19 10*3/MM3 (ref 0.7–3.1)
LYMPHOCYTES NFR BLD AUTO: 32.8 % (ref 19.6–45.3)
MCH RBC QN AUTO: 23.3 PG (ref 26.6–33)
MCHC RBC AUTO-ENTMCNC: 30.1 G/DL (ref 31.5–35.7)
MCV RBC AUTO: 77.5 FL (ref 79–97)
MONOCYTES # BLD AUTO: 0.61 10*3/MM3 (ref 0.1–0.9)
MONOCYTES NFR BLD AUTO: 6.3 % (ref 5–12)
NEUTROPHILS NFR BLD AUTO: 5.34 10*3/MM3 (ref 1.7–7)
NEUTROPHILS NFR BLD AUTO: 54.9 % (ref 42.7–76)
PLATELET # BLD AUTO: 420 10*3/MM3 (ref 140–450)
PMV BLD AUTO: 10.1 FL (ref 6–12)
POTASSIUM SERPL-SCNC: 4 MMOL/L (ref 3.5–5.2)
PROT SERPL-MCNC: 7.5 G/DL (ref 6–8.5)
RBC # BLD AUTO: 5.79 10*6/MM3 (ref 3.77–5.28)
SODIUM SERPL-SCNC: 139 MMOL/L (ref 136–145)
TIBC SERPL-MCNC: 383 MCG/DL (ref 298–536)
TRANSFERRIN SERPL-MCNC: 257 MG/DL (ref 200–360)
VIT B12 BLD-MCNC: 425 PG/ML (ref 211–946)
WBC NRBC COR # BLD AUTO: 9.72 10*3/MM3 (ref 3.4–10.8)

## 2025-07-01 PROCEDURE — 80053 COMPREHEN METABOLIC PANEL: CPT | Performed by: STUDENT IN AN ORGANIZED HEALTH CARE EDUCATION/TRAINING PROGRAM

## 2025-07-01 PROCEDURE — 83540 ASSAY OF IRON: CPT | Performed by: STUDENT IN AN ORGANIZED HEALTH CARE EDUCATION/TRAINING PROGRAM

## 2025-07-01 PROCEDURE — 99214 OFFICE O/P EST MOD 30 MIN: CPT | Performed by: NURSE PRACTITIONER

## 2025-07-01 PROCEDURE — 84466 ASSAY OF TRANSFERRIN: CPT | Performed by: STUDENT IN AN ORGANIZED HEALTH CARE EDUCATION/TRAINING PROGRAM

## 2025-07-01 PROCEDURE — 82728 ASSAY OF FERRITIN: CPT | Performed by: STUDENT IN AN ORGANIZED HEALTH CARE EDUCATION/TRAINING PROGRAM

## 2025-07-01 PROCEDURE — 85025 COMPLETE CBC W/AUTO DIFF WBC: CPT

## 2025-07-01 PROCEDURE — 82607 VITAMIN B-12: CPT | Performed by: STUDENT IN AN ORGANIZED HEALTH CARE EDUCATION/TRAINING PROGRAM

## 2025-07-01 PROCEDURE — 36415 COLL VENOUS BLD VENIPUNCTURE: CPT

## 2025-07-01 PROCEDURE — 82746 ASSAY OF FOLIC ACID SERUM: CPT | Performed by: STUDENT IN AN ORGANIZED HEALTH CARE EDUCATION/TRAINING PROGRAM

## 2025-07-15 ENCOUNTER — OFFICE VISIT (OUTPATIENT)
Dept: FAMILY MEDICINE CLINIC | Facility: CLINIC | Age: 25
End: 2025-07-15
Payer: COMMERCIAL

## 2025-07-15 ENCOUNTER — LAB (OUTPATIENT)
Dept: FAMILY MEDICINE CLINIC | Facility: CLINIC | Age: 25
End: 2025-07-15
Payer: COMMERCIAL

## 2025-07-15 VITALS
TEMPERATURE: 97.2 F | BODY MASS INDEX: 47.88 KG/M2 | RESPIRATION RATE: 16 BRPM | HEIGHT: 63 IN | HEART RATE: 98 BPM | OXYGEN SATURATION: 98 % | DIASTOLIC BLOOD PRESSURE: 86 MMHG | WEIGHT: 270.2 LBS | SYSTOLIC BLOOD PRESSURE: 128 MMHG

## 2025-07-15 DIAGNOSIS — R73.9 HYPERGLYCEMIA: ICD-10-CM

## 2025-07-15 DIAGNOSIS — Z02.0 ENCOUNTER FOR SCHOOL EXAMINATION: ICD-10-CM

## 2025-07-15 DIAGNOSIS — Z00.00 ENCOUNTER FOR WELL ADULT EXAM WITHOUT ABNORMAL FINDINGS: Primary | ICD-10-CM

## 2025-07-15 DIAGNOSIS — Z83.3 FAMILY HISTORY OF DIABETES MELLITUS IN FATHER: ICD-10-CM

## 2025-07-15 LAB
ANION GAP SERPL CALCULATED.3IONS-SCNC: 10.4 MMOL/L (ref 5–15)
BUN SERPL-MCNC: 7 MG/DL (ref 6–20)
BUN/CREAT SERPL: 9.2 (ref 7–25)
CALCIUM SPEC-SCNC: 9.3 MG/DL (ref 8.6–10.5)
CHLORIDE SERPL-SCNC: 104 MMOL/L (ref 98–107)
CHOLEST SERPL-MCNC: 192 MG/DL (ref 0–200)
CO2 SERPL-SCNC: 25.6 MMOL/L (ref 22–29)
CREAT SERPL-MCNC: 0.76 MG/DL (ref 0.57–1)
EGFRCR SERPLBLD CKD-EPI 2021: 111 ML/MIN/1.73
GLUCOSE SERPL-MCNC: 105 MG/DL (ref 65–99)
HBA1C MFR BLD: 6.3 % (ref 4.8–5.6)
HDLC SERPL-MCNC: 43 MG/DL (ref 40–60)
LDLC SERPL CALC-MCNC: 129 MG/DL (ref 0–100)
LDLC/HDLC SERPL: 2.95 {RATIO}
POTASSIUM SERPL-SCNC: 4.2 MMOL/L (ref 3.5–5.2)
SODIUM SERPL-SCNC: 140 MMOL/L (ref 136–145)
T4 FREE SERPL-MCNC: 1.25 NG/DL (ref 0.92–1.68)
TRIGL SERPL-MCNC: 111 MG/DL (ref 0–150)
TSH SERPL DL<=0.05 MIU/L-ACNC: 2.65 UIU/ML (ref 0.27–4.2)
VLDLC SERPL-MCNC: 20 MG/DL (ref 5–40)

## 2025-07-15 PROCEDURE — 80061 LIPID PANEL: CPT | Performed by: FAMILY MEDICINE

## 2025-07-15 PROCEDURE — 83036 HEMOGLOBIN GLYCOSYLATED A1C: CPT | Performed by: FAMILY MEDICINE

## 2025-07-15 PROCEDURE — 36415 COLL VENOUS BLD VENIPUNCTURE: CPT | Performed by: FAMILY MEDICINE

## 2025-07-15 PROCEDURE — 84439 ASSAY OF FREE THYROXINE: CPT | Performed by: FAMILY MEDICINE

## 2025-07-15 PROCEDURE — 84443 ASSAY THYROID STIM HORMONE: CPT | Performed by: FAMILY MEDICINE

## 2025-07-15 PROCEDURE — 80048 BASIC METABOLIC PNL TOTAL CA: CPT | Performed by: FAMILY MEDICINE

## 2025-07-15 NOTE — PROGRESS NOTES
Subjective   Barrington Prather is a 25 y.o. female.     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 She reports no changes in her health history since last year. She has been experiencing a cough when exposed to dust, which she attributes to allergies. She has been taking Zyrtec as needed for her allergies, but it appears to be less effective recently. She reports no shortness of breath or wheezing. Additionally, she reports no gastrointestinal symptoms such as stomach pain, nausea, vomiting, or diarrhea, and no urinary symptoms.        The following portions of the patient's history were reviewed and updated as appropriate: past medical history, past social history, past surgical history and problem list.    Review of Systems   Constitutional:  Negative for fatigue and fever.   HENT:  Negative for sore throat and swollen glands.    Respiratory:  Negative for shortness of breath.    Cardiovascular:  Negative for chest pain.   Gastrointestinal:  Negative for abdominal pain, nausea and vomiting.   Musculoskeletal:  Negative for myalgias and neck pain.   Skin:  Negative for rash.   Psychiatric/Behavioral:  Negative for sleep disturbance and depressed mood. The patient is not nervous/anxious.        Results  Diagnostic Testing   - TB test: Negative ( done at [facility]).    Objective   Physical Exam  Vitals reviewed.   Constitutional:       Appearance: She is well-developed.   Neck:      Thyroid: No thyromegaly.   Cardiovascular:      Heart sounds: Normal heart sounds.   Pulmonary:      Effort: Pulmonary effort is normal.      Breath sounds: Normal breath sounds. No wheezing.   Abdominal:      Tenderness: There is no abdominal tenderness.   Neurological:      Mental Status: She is  alert and oriented to person, place, and time.   Psychiatric:         Mood and Affect: Mood normal.         Vitals:    07/15/25 1111   BP: 128/86   Pulse: 98   Resp: 16   Temp: 97.2 °F (36.2 °C)   SpO2: 98%   Body mass index is 47.88 kg/m².    Current Outpatient Medications on File Prior to Visit   Medication Sig Dispense Refill    ferrous sulfate 325 (65 FE) MG tablet Take 1 tablet by mouth Daily With Breakfast. 90 tablet 1    folic acid (FOLVITE) 1 MG tablet TAKE 1 TABLET BY MOUTH DAILY 90 tablet 1    medroxyPROGESTERone (PROVERA) 10 MG tablet If no mentrual cycle within 60 days of 1st day of previous cycle take 1 pill daily for 10 days       No current facility-administered medications on file prior to visit.         Assessment & Plan   Problems Addressed this Visit       Encounter for school examination    Encounter for well adult exam without abnormal findings - Primary    Relevant Orders    TSH+Free T4 (Completed)    Lipid panel (Completed)    Basic metabolic panel (Completed)     Other Visit Diagnoses         Hyperglycemia        Relevant Orders    Hemoglobin A1c (Completed)      Family history of diabetes mellitus in father        Relevant Orders    Hemoglobin A1c (Completed)      Body mass index (BMI) of 45.0 to 49.9 in adult        Relevant Orders    Lipid panel (Completed)          Diagnoses         Codes Comments      Encounter for well adult exam without abnormal findings    -  Primary ICD-10-CM: Z00.00  ICD-9-CM: V70.0       Encounter for school examination     ICD-10-CM: Z02.0  ICD-9-CM: V70.5       Hyperglycemia     ICD-10-CM: R73.9  ICD-9-CM: 790.29       Family history of diabetes mellitus in father     ICD-10-CM: Z83.3  ICD-9-CM: V18.0       Body mass index (BMI) of 45.0 to 49.9 in adult     ICD-10-CM: Z68.42  ICD-9-CM: V85.42             Assessment & Plan  1. School physical/ annual physical.  Discussed healthy diet and  importance of regular exercise. Stressed importance of moderation in  sodium/caffeine intake,  cholesterol, caloric balance, sufficient intake of fresh fruits and vegetables.   - Blood pressure is 128/86, within normal range.  - Up-to-date on immunizations, including influenza vaccine received in 09/2024.  - Recent TB test conducted at the health department, negative results.  - Advised to switch from Zyrtec to Claritin 10 mg, to be taken at bedtime for a few weeks due to ineffective allergy control.           Patient or patient representative verbalized consent for the use of Ambient Listening during the visit with  Wilner Zhu MD for chart documentation. 7/20/2025  11:17 EDT